# Patient Record
Sex: FEMALE | Race: BLACK OR AFRICAN AMERICAN | Employment: UNEMPLOYED | URBAN - METROPOLITAN AREA
[De-identification: names, ages, dates, MRNs, and addresses within clinical notes are randomized per-mention and may not be internally consistent; named-entity substitution may affect disease eponyms.]

---

## 2018-06-16 ENCOUNTER — HOSPITAL ENCOUNTER (EMERGENCY)
Age: 48
Discharge: HOME | End: 2018-06-16
Attending: EMERGENCY MEDICINE
Payer: COMMERCIAL

## 2018-06-16 ENCOUNTER — APPOINTMENT (OUTPATIENT)
Dept: GENERAL RADIOLOGY | Age: 48
End: 2018-06-16
Attending: EMERGENCY MEDICINE
Payer: COMMERCIAL

## 2018-06-16 VITALS
BODY MASS INDEX: 37.8 KG/M2 | RESPIRATION RATE: 18 BRPM | HEIGHT: 71 IN | OXYGEN SATURATION: 98 % | TEMPERATURE: 98.3 F | HEART RATE: 80 BPM | DIASTOLIC BLOOD PRESSURE: 70 MMHG | SYSTOLIC BLOOD PRESSURE: 120 MMHG | WEIGHT: 270 LBS

## 2018-06-16 DIAGNOSIS — R07.9 ACUTE CHEST PAIN: Primary | ICD-10-CM

## 2018-06-16 LAB
ALBUMIN SERPL-MCNC: 3.3 G/DL (ref 3.5–5)
ALBUMIN/GLOB SERPL: 0.8 {RATIO} (ref 1.2–3.5)
ALP SERPL-CCNC: 66 U/L (ref 50–136)
ALT SERPL-CCNC: 17 U/L (ref 12–65)
ANION GAP SERPL CALC-SCNC: 9 MMOL/L (ref 7–16)
AST SERPL-CCNC: 26 U/L (ref 15–37)
BASOPHILS # BLD: 0 K/UL (ref 0–0.2)
BASOPHILS NFR BLD: 0 % (ref 0–2)
BILIRUB SERPL-MCNC: 0.3 MG/DL (ref 0.2–1.1)
BNP SERPL-MCNC: 9 PG/ML
BUN SERPL-MCNC: 13 MG/DL (ref 6–23)
CALCIUM SERPL-MCNC: 9.5 MG/DL (ref 8.3–10.4)
CHLORIDE SERPL-SCNC: 106 MMOL/L (ref 98–107)
CO2 SERPL-SCNC: 24 MMOL/L (ref 21–32)
CREAT SERPL-MCNC: 1.05 MG/DL (ref 0.6–1)
DIFFERENTIAL METHOD BLD: ABNORMAL
EOSINOPHIL # BLD: 0.2 K/UL (ref 0–0.8)
EOSINOPHIL NFR BLD: 3 % (ref 0.5–7.8)
ERYTHROCYTE [DISTWIDTH] IN BLOOD BY AUTOMATED COUNT: 15.9 % (ref 11.9–14.6)
GLOBULIN SER CALC-MCNC: 4.3 G/DL (ref 2.3–3.5)
GLUCOSE SERPL-MCNC: 99 MG/DL (ref 65–100)
HCT VFR BLD AUTO: 35.4 % (ref 35.8–46.3)
HGB BLD-MCNC: 11.4 G/DL (ref 11.7–15.4)
IMM GRANULOCYTES # BLD: 0 K/UL (ref 0–0.5)
IMM GRANULOCYTES NFR BLD AUTO: 0 % (ref 0–5)
LYMPHOCYTES # BLD: 2.3 K/UL (ref 0.5–4.6)
LYMPHOCYTES NFR BLD: 34 % (ref 13–44)
MCH RBC QN AUTO: 24.9 PG (ref 26.1–32.9)
MCHC RBC AUTO-ENTMCNC: 32.2 G/DL (ref 31.4–35)
MCV RBC AUTO: 77.5 FL (ref 79.6–97.8)
MONOCYTES # BLD: 0.6 K/UL (ref 0.1–1.3)
MONOCYTES NFR BLD: 9 % (ref 4–12)
NEUTS SEG # BLD: 3.6 K/UL (ref 1.7–8.2)
NEUTS SEG NFR BLD: 54 % (ref 43–78)
PLATELET # BLD AUTO: 307 K/UL (ref 150–450)
PMV BLD AUTO: 9.3 FL (ref 10.8–14.1)
POTASSIUM SERPL-SCNC: 4.2 MMOL/L (ref 3.5–5.1)
PROT SERPL-MCNC: 7.6 G/DL (ref 6.3–8.2)
RBC # BLD AUTO: 4.57 M/UL (ref 4.05–5.25)
SODIUM SERPL-SCNC: 139 MMOL/L (ref 136–145)
TROPONIN I BLD-MCNC: 0 NG/ML (ref 0.02–0.05)
TROPONIN I BLD-MCNC: 0 NG/ML (ref 0.02–0.05)
WBC # BLD AUTO: 6.7 K/UL (ref 4.3–11.1)

## 2018-06-16 PROCEDURE — 85025 COMPLETE CBC W/AUTO DIFF WBC: CPT | Performed by: EMERGENCY MEDICINE

## 2018-06-16 PROCEDURE — 71045 X-RAY EXAM CHEST 1 VIEW: CPT

## 2018-06-16 PROCEDURE — 99285 EMERGENCY DEPT VISIT HI MDM: CPT | Performed by: EMERGENCY MEDICINE

## 2018-06-16 PROCEDURE — 93005 ELECTROCARDIOGRAM TRACING: CPT | Performed by: EMERGENCY MEDICINE

## 2018-06-16 PROCEDURE — 83880 ASSAY OF NATRIURETIC PEPTIDE: CPT | Performed by: EMERGENCY MEDICINE

## 2018-06-16 PROCEDURE — 84484 ASSAY OF TROPONIN QUANT: CPT

## 2018-06-16 PROCEDURE — 80053 COMPREHEN METABOLIC PANEL: CPT | Performed by: EMERGENCY MEDICINE

## 2018-06-16 PROCEDURE — 74011250637 HC RX REV CODE- 250/637: Performed by: EMERGENCY MEDICINE

## 2018-06-16 PROCEDURE — 74011000250 HC RX REV CODE- 250: Performed by: EMERGENCY MEDICINE

## 2018-06-16 PROCEDURE — 74011250636 HC RX REV CODE- 250/636: Performed by: EMERGENCY MEDICINE

## 2018-06-16 RX ORDER — DEXAMETHASONE SODIUM PHOSPHATE 100 MG/10ML
10 INJECTION INTRAMUSCULAR; INTRAVENOUS
Status: COMPLETED | OUTPATIENT
Start: 2018-06-16 | End: 2018-06-16

## 2018-06-16 RX ORDER — NAPROXEN SODIUM 550 MG/1
550 TABLET ORAL 2 TIMES DAILY WITH MEALS
Qty: 20 TAB | Refills: 0 | Status: SHIPPED | OUTPATIENT
Start: 2018-06-16 | End: 2019-05-02

## 2018-06-16 RX ORDER — RANITIDINE 150 MG/1
150 TABLET, FILM COATED ORAL 2 TIMES DAILY
Qty: 60 TAB | Refills: 2 | Status: SHIPPED | OUTPATIENT
Start: 2018-06-16 | End: 2019-05-10

## 2018-06-16 RX ORDER — LIDOCAINE HYDROCHLORIDE 20 MG/ML
15 SOLUTION OROPHARYNGEAL
Status: COMPLETED | OUTPATIENT
Start: 2018-06-16 | End: 2018-06-16

## 2018-06-16 RX ADMIN — DEXAMETHASONE SODIUM PHOSPHATE 10 MG: 10 INJECTION INTRAMUSCULAR; INTRAVENOUS at 23:10

## 2018-06-16 RX ADMIN — Medication 30 ML: at 20:32

## 2018-06-16 RX ADMIN — LIDOCAINE HYDROCHLORIDE 15 ML: 20 SOLUTION ORAL; TOPICAL at 20:32

## 2018-06-16 NOTE — LETTER
[address] [facility] EMERGENCY DEPT One [address] [address] 
[phone] Work/School Note Date: 6/16/2018 To Whom It May concern: 
 
Rebeca Mims was seen and treated today in the emergency room by the following provider(s): 
Attending Provider: Gianna Ellis MD. Rebeca Mims may return to work on 06/19/18 Sincerely, Gonzalo Evangelista

## 2018-06-17 LAB
ATRIAL RATE: 100 BPM
CALCULATED P AXIS, ECG09: 59 DEGREES
CALCULATED R AXIS, ECG10: 30 DEGREES
CALCULATED T AXIS, ECG11: 39 DEGREES
DIAGNOSIS, 93000: NORMAL
P-R INTERVAL, ECG05: 172 MS
Q-T INTERVAL, ECG07: 312 MS
QRS DURATION, ECG06: 70 MS
QTC CALCULATION (BEZET), ECG08: 402 MS
VENTRICULAR RATE, ECG03: 100 BPM

## 2018-06-17 NOTE — ED NOTES
I have reviewed discharge instructions with the patient. The patient verbalized understanding. Patient left ED via Discharge Method: ambulatory to Home with daughter. Opportunity for questions and clarification provided. Patient given 2 scripts. To continue your aftercare when you leave the hospital, you may receive an automated call from our care team to check in on how you are doing. This is a free service and part of our promise to provide the best care and service to meet your aftercare needs.  If you have questions, or wish to unsubscribe from this service please call [phone]. Thank you for Choosing our [facility] Emergency Department.

## 2018-06-17 NOTE — ED PROVIDER NOTES
HPI Comments: Per nurse's notes: \"Pt arrived via POV with c/o chest pain. \"It started at work earlier today, like a tightness in my chest. Came back about a half hour ago and now it's tight, kind of goes up into my throat; it;'s bad. \" \"    Patient states she chronically has trouble with heartburn, rarely takes anything for it but if she does take something at this point for mustard. Patient is a 41 y.o. female presenting with chest pain. The history is provided by the patient and a relative. Chest Pain (Angina)    This is a new problem. The current episode started 3 to 5 hours ago. The problem has been gradually improving. Duration of episode(s) is 3 hours. The problem occurs rarely. The pain is associated with normal activity and stress. The pain is present in the substernal region. The pain is moderate. The quality of the pain is described as sharp and dull. The pain radiates to the left neck and right neck. Exacerbated by: nothing. Associated symptoms include diaphoresis and nausea. Pertinent negatives include no abdominal pain, no back pain, no claudication, no cough, no dizziness, no exertional chest pressure, no fever, no headaches, no hemoptysis, no irregular heartbeat, no leg pain, no lower extremity edema, no malaise/fatigue, no near-syncope, no numbness, no orthopnea, no palpitations, no PND, no shortness of breath, no sputum production, no vomiting and no weakness. She has tried rest for the symptoms. The treatment provided mild relief. Risk factors include no risk factors. Her past medical history does not include aneurysm, cancer, DM, DVT, HTN, PE or CHF. Pertinent negatives include no exercise treadmill test.       Past Medical History:   Diagnosis Date    Ill-defined condition     hx migraines       Past Surgical History:   Procedure Laterality Date    HX TUBAL LIGATION           No family history on file.     Social History     Social History    Marital status: SINGLE     Spouse name: N/A   Adan Humphrey Number of children: N/A    Years of education: N/A     Occupational History    Not on file. Social History Main Topics    Smoking status: Never Smoker    Smokeless tobacco: Not on file    Alcohol use No    Drug use: No    Sexual activity: No     Other Topics Concern    Not on file     Social History Narrative         ALLERGIES: Review of patient's allergies indicates no known allergies. Review of Systems   Constitutional: Positive for diaphoresis. Negative for chills, fever and malaise/fatigue. Respiratory: Negative for cough, hemoptysis, sputum production and shortness of breath. Cardiovascular: Positive for chest pain. Negative for palpitations, orthopnea, claudication, PND and near-syncope. Gastrointestinal: Positive for nausea. Negative for abdominal pain and vomiting. Musculoskeletal: Negative for back pain. Neurological: Negative for dizziness, weakness, numbness and headaches. All other systems reviewed and are negative. Vitals:    06/16/18 2007   BP: (!) 168/96   Pulse: (!) 104   Resp: 20   Temp: 98.3 °F (36.8 °C)   SpO2: 99%   Weight: 122.5 kg (270 lb)   Height: 5' 11\" (1.803 m)            Physical Exam   Constitutional: She is oriented to person, place, and time. She appears well-developed and well-nourished. No distress. HENT:   Head: Normocephalic and atraumatic. Right Ear: Tympanic membrane and external ear normal.   Left Ear: Tympanic membrane and external ear normal.   Mouth/Throat: Oropharynx is clear and moist.   Eyes: Conjunctivae and EOM are normal. Pupils are equal, round, and reactive to light. Neck: Normal range of motion. Neck supple. No tracheal deviation present. Cardiovascular: Normal rate, regular rhythm, normal heart sounds and intact distal pulses. Exam reveals no gallop and no friction rub. No murmur heard. Pulmonary/Chest: Effort normal and breath sounds normal. No respiratory distress. She has no wheezes. She exhibits tenderness. Abdominal: Soft. Bowel sounds are normal. She exhibits no shifting dullness, no distension, no pulsatile liver, no fluid wave, no abdominal bruit, no pulsatile midline mass and no mass. There is no hepatosplenomegaly. There is tenderness (mild ) in the epigastric area. There is no rigidity, no rebound, no guarding, no CVA tenderness, no tenderness at McBurney's point and negative Jones's sign. No hernia. Musculoskeletal: Normal range of motion. She exhibits no edema. Lymphadenopathy:     She has no cervical adenopathy. Neurological: She is alert and oriented to person, place, and time. She displays normal reflexes. No cranial nerve deficit. Skin: Skin is warm and dry. No rash noted. She is not diaphoretic. No erythema. Psychiatric: She has a normal mood and affect. Nursing note and vitals reviewed.        MDM  Number of Diagnoses or Management Options  Acute chest pain: new and requires workup     Amount and/or Complexity of Data Reviewed  Clinical lab tests: ordered and reviewed  Tests in the radiology section of CPT®: ordered and reviewed  Review and summarize past medical records: yes  Independent visualization of images, tracings, or specimens: yes    Risk of Complications, Morbidity, and/or Mortality  Presenting problems: high  Diagnostic procedures: moderate  Management options: moderate    Patient Progress  Patient progress: improved        ED Course       Procedures    Results Reviewed:      Recent Results (from the past 24 hour(s))   CBC WITH AUTOMATED DIFF    Collection Time: 06/16/18  8:21 PM   Result Value Ref Range    WBC 6.7 4.3 - 11.1 K/uL    RBC 4.57 4.05 - 5.25 M/uL    HGB 11.4 (L) 11.7 - 15.4 g/dL    HCT 35.4 (L) 35.8 - 46.3 %    MCV 77.5 (L) 79.6 - 97.8 FL    MCH 24.9 (L) 26.1 - 32.9 PG    MCHC 32.2 31.4 - 35.0 g/dL    RDW 15.9 (H) 11.9 - 14.6 %    PLATELET 280 141 - 710 K/uL    MPV 9.3 (L) 10.8 - 14.1 FL    DF AUTOMATED      NEUTROPHILS 54 43 - 78 %    LYMPHOCYTES 34 13 - 44 % MONOCYTES 9 4.0 - 12.0 %    EOSINOPHILS 3 0.5 - 7.8 %    BASOPHILS 0 0.0 - 2.0 %    IMMATURE GRANULOCYTES 0 0.0 - 5.0 %    ABS. NEUTROPHILS 3.6 1.7 - 8.2 K/UL    ABS. LYMPHOCYTES 2.3 0.5 - 4.6 K/UL    ABS. MONOCYTES 0.6 0.1 - 1.3 K/UL    ABS. EOSINOPHILS 0.2 0.0 - 0.8 K/UL    ABS. BASOPHILS 0.0 0.0 - 0.2 K/UL    ABS. IMM. GRANS. 0.0 0.0 - 0.5 K/UL   METABOLIC PANEL, COMPREHENSIVE    Collection Time: 06/16/18  8:21 PM   Result Value Ref Range    Sodium 139 136 - 145 mmol/L    Potassium 4.2 3.5 - 5.1 mmol/L    Chloride 106 98 - 107 mmol/L    CO2 24 21 - 32 mmol/L    Anion gap 9 7 - 16 mmol/L    Glucose 99 65 - 100 mg/dL    BUN 13 6 - 23 MG/DL    Creatinine 1.05 (H) 0.6 - 1.0 MG/DL    GFR est AA >60 >60 ml/min/1.73m2    GFR est non-AA >60 >60 ml/min/1.73m2    Calcium 9.5 8.3 - 10.4 MG/DL    Bilirubin, total 0.3 0.2 - 1.1 MG/DL    ALT (SGPT) 17 12 - 65 U/L    AST (SGOT) 26 15 - 37 U/L    Alk. phosphatase 66 50 - 136 U/L    Protein, total 7.6 6.3 - 8.2 g/dL    Albumin 3.3 (L) 3.5 - 5.0 g/dL    Globulin 4.3 (H) 2.3 - 3.5 g/dL    A-G Ratio 0.8 (L) 1.2 - 3.5     BNP    Collection Time: 06/16/18  8:21 PM   Result Value Ref Range    BNP 9 pg/mL   POC TROPONIN-I    Collection Time: 06/16/18  8:25 PM   Result Value Ref Range    Troponin-I (POC) 0 (L) 0.02 - 0.05 ng/ml   POC TROPONIN-I    Collection Time: 06/16/18 10:17 PM   Result Value Ref Range    Troponin-I (POC) 0 (L) 0.02 - 0.05 ng/ml     XR CHEST PORT   Final Result   IMPRESSION: No acute abnormality          I discussed the results of all labs, procedures, radiographs, and treatments with the patient and available family. Treatment plan is agreed upon and the patient is ready for discharge. All voiced understanding of the discharge plan and medication instructions or changes as appropriate. Questions about treatment in the ED were answered. All were encouraged to return should symptoms worsen or new problems develop.

## 2018-06-17 NOTE — ED TRIAGE NOTES
Pt arrived via POV with c/o chest pain. \"It started at work earlier today, like a tightness in my chest. Came back about a half hour ago and now it's tight, kind of goes up into my throat; it;'s bad. \"

## 2018-06-17 NOTE — DISCHARGE INSTRUCTIONS
Chest Pain: Care Instructions  Your Care Instructions    There are many things that can cause chest pain. Some are not serious and will get better on their own in a few days. But some kinds of chest pain need more testing and treatment. Your doctor may have recommended a follow-up visit in the next 8 to 12 hours. If you are not getting better, you may need more tests or treatment. Even though your doctor has released you, you still need to watch for any problems. The doctor carefully checked you, but sometimes problems can develop later. If you have new symptoms or if your symptoms do not get better, get medical care right away. If you have worse or different chest pain or pressure that lasts more than 5 minutes or you passed out (lost consciousness), call 911 or seek other emergency help right away. A medical visit is only one step in your treatment. Even if you feel better, you still need to do what your doctor recommends, such as going to all suggested follow-up appointments and taking medicines exactly as directed. This will help you recover and help prevent future problems. How can you care for yourself at home? · Rest until you feel better. · Take your medicine exactly as prescribed. Call your doctor if you think you are having a problem with your medicine. · Do not drive after taking a prescription pain medicine. When should you call for help? Call 911 if:  ? · You passed out (lost consciousness). ? · You have severe difficulty breathing. ? · You have symptoms of a heart attack. These may include:  ¨ Chest pain or pressure, or a strange feeling in your chest.  ¨ Sweating. ¨ Shortness of breath. ¨ Nausea or vomiting. ¨ Pain, pressure, or a strange feeling in your back, neck, jaw, or upper belly or in one or both shoulders or arms. ¨ Lightheadedness or sudden weakness. ¨ A fast or irregular heartbeat.   After you call 911, the  may tell you to chew 1 adult-strength or 2 to 4 low-dose aspirin. Wait for an ambulance. Do not try to drive yourself. ?Call your doctor today if:  ? · You have any trouble breathing. ? · Your chest pain gets worse. ? · You are dizzy or lightheaded, or you feel like you may faint. ? · You are not getting better as expected. ? · You are having new or different chest pain. Where can you learn more? Go to http://vera-rito.info/. Enter A120 in the search box to learn more about \"Chest Pain: Care Instructions. \"  Current as of: March 20, 2017  Content Version: 11.4  © 4153-5740 [employer]. Care instructions adapted under license by [employer]) (which disclaims liability or warranty for this information). If you have questions about a medical condition or this instruction, always ask your healthcare professional. [place] [address] any warranty or liability for your use of this information. Musculoskeletal Chest Pain: Care Instructions  Your Care Instructions    Chest pain is not always a sign that something is wrong with your heart or that you have another serious problem. The doctor thinks your chest pain is caused by strained muscles or ligaments, inflamed chest cartilage, or another problem in your chest, rather than by your heart. You may need more tests to find the cause of your chest pain. Follow-up care is a key part of your treatment and safety. Be sure to make and go to all appointments, and call your doctor if you are having problems. It's also a good idea to know your test results and keep a list of the medicines you take. How can you care for yourself at home? · Take pain medicines exactly as directed. ¨ If the doctor gave you a prescription medicine for pain, take it as prescribed. ¨ If you are not taking a prescription pain medicine, ask your doctor if you can take an over-the-counter medicine. · Rest and protect the sore area.   · Stop, change, or take a break from any activity that may be causing your pain or soreness. · Put ice or a cold pack on the sore area for 10 to 20 minutes at a time. Try to do this every 1 to 2 hours for the next 3 days (when you are awake) or until the swelling goes down. Put a thin cloth between the ice and your skin. · After 2 or 3 days, apply a heating pad set on low or a warm cloth to the area that hurts. Some doctors suggest that you go back and forth between hot and cold. · Do not wrap or tape your ribs for support. This may cause you to take smaller breaths, which could increase your risk of lung problems. · Mentholated creams such as Bengay or Icy Hot may soothe sore muscles. Follow the instructions on the package. · Follow your doctor's instructions for exercising. · Gentle stretching and massage may help you get better faster. Stretch slowly to the point just before pain begins, and hold the stretch for at least 15 to 30 seconds. Do this 3 or 4 times a day. Stretch just after you have applied heat. · As your pain gets better, slowly return to your normal activities. Any increased pain may be a sign that you need to rest a while longer. When should you call for help? Call 911 anytime you think you may need emergency care. For example, call if:  ? · You have chest pain or pressure. This may occur with:  ¨ Sweating. ¨ Shortness of breath. ¨ Nausea or vomiting. ¨ Pain that spreads from the chest to the neck, jaw, or one or both shoulders or arms. ¨ Dizziness or lightheadedness. ¨ A fast or uneven pulse. After calling 911, chew 1 adult-strength aspirin. Wait for an ambulance. Do not try to drive yourself. ? · You have sudden chest pain and shortness of breath, or you cough up blood. ?Call your doctor now or seek immediate medical care if:  ? · You have any trouble breathing. ? · Your chest pain gets worse. ? · Your chest pain occurs consistently with exercise and is relieved by rest.   ? Watch closely for changes in your health, and be sure to contact your doctor if:  ? · Your chest pain does not get better after 1 week. Where can you learn more? Go to http://vera-rito.info/. Enter V293 in the search box to learn more about \"Musculoskeletal Chest Pain: Care Instructions. \"  Current as of: March 20, 2017  Content Version: 11.4  © 9898-3078 [employer]. Care instructions adapted under license by [employer] (which disclaims liability or warranty for this information). If you have questions about a medical condition or this instruction, always ask your healthcare professional. [place] [address] any warranty or liability for your use of this information.

## 2019-05-02 ENCOUNTER — HOSPITAL ENCOUNTER (EMERGENCY)
Age: 49
Discharge: HOME | End: 2019-05-02
Attending: EMERGENCY MEDICINE
Payer: SELF-PAY

## 2019-05-02 ENCOUNTER — APPOINTMENT (OUTPATIENT)
Dept: GENERAL RADIOLOGY | Age: 49
End: 2019-05-02
Attending: EMERGENCY MEDICINE
Payer: SELF-PAY

## 2019-05-02 VITALS
SYSTOLIC BLOOD PRESSURE: 142 MMHG | DIASTOLIC BLOOD PRESSURE: 75 MMHG | RESPIRATION RATE: 20 BRPM | TEMPERATURE: 98.3 F | HEIGHT: 71 IN | BODY MASS INDEX: 37.8 KG/M2 | HEART RATE: 86 BPM | WEIGHT: 270 LBS | OXYGEN SATURATION: 94 %

## 2019-05-02 DIAGNOSIS — D50.8 OTHER IRON DEFICIENCY ANEMIA: ICD-10-CM

## 2019-05-02 DIAGNOSIS — R07.89 ATYPICAL CHEST PAIN: Primary | ICD-10-CM

## 2019-05-02 LAB
ALBUMIN SERPL-MCNC: 3.1 G/DL (ref 3.5–5)
ALBUMIN/GLOB SERPL: 0.7 {RATIO} (ref 1.2–3.5)
ALP SERPL-CCNC: 68 U/L (ref 50–136)
ALT SERPL-CCNC: 13 U/L (ref 12–65)
ANION GAP SERPL CALC-SCNC: 9 MMOL/L (ref 7–16)
AST SERPL-CCNC: 19 U/L (ref 15–37)
ATRIAL RATE: 110 BPM
BASOPHILS # BLD: 0 K/UL (ref 0–0.2)
BASOPHILS NFR BLD: 1 % (ref 0–2)
BILIRUB SERPL-MCNC: 0.4 MG/DL (ref 0.2–1.1)
BUN SERPL-MCNC: 7 MG/DL (ref 6–23)
CALCIUM SERPL-MCNC: 8.9 MG/DL (ref 8.3–10.4)
CALCULATED P AXIS, ECG09: 60 DEGREES
CALCULATED R AXIS, ECG10: 51 DEGREES
CALCULATED T AXIS, ECG11: 28 DEGREES
CHLORIDE SERPL-SCNC: 109 MMOL/L (ref 98–107)
CO2 SERPL-SCNC: 22 MMOL/L (ref 21–32)
CREAT SERPL-MCNC: 0.91 MG/DL (ref 0.6–1)
D DIMER PPP FEU-MCNC: <0.27 UG/ML(FEU)
DIAGNOSIS, 93000: NORMAL
DIFFERENTIAL METHOD BLD: ABNORMAL
EOSINOPHIL # BLD: 0.2 K/UL (ref 0–0.8)
EOSINOPHIL NFR BLD: 2 % (ref 0.5–7.8)
ERYTHROCYTE [DISTWIDTH] IN BLOOD BY AUTOMATED COUNT: 16.2 % (ref 11.9–14.6)
FERRITIN SERPL-MCNC: 9 NG/ML (ref 8–388)
FOLATE SERPL-MCNC: 6.7 NG/ML (ref 3.1–17.5)
GLOBULIN SER CALC-MCNC: 4.2 G/DL (ref 2.3–3.5)
GLUCOSE SERPL-MCNC: 99 MG/DL (ref 65–100)
HCT VFR BLD AUTO: 34.2 % (ref 35.8–46.3)
HGB BLD-MCNC: 10.2 G/DL (ref 11.7–15.4)
IMM GRANULOCYTES # BLD AUTO: 0 K/UL (ref 0–0.5)
IMM GRANULOCYTES NFR BLD AUTO: 0 % (ref 0–5)
IRON SATN MFR SERPL: 10 %
IRON SERPL-MCNC: 34 UG/DL (ref 35–150)
LYMPHOCYTES # BLD: 2 K/UL (ref 0.5–4.6)
LYMPHOCYTES NFR BLD: 29 % (ref 13–44)
MCH RBC QN AUTO: 22.2 PG (ref 26.1–32.9)
MCHC RBC AUTO-ENTMCNC: 29.8 G/DL (ref 31.4–35)
MCV RBC AUTO: 74.3 FL (ref 79.6–97.8)
MONOCYTES # BLD: 0.5 K/UL (ref 0.1–1.3)
MONOCYTES NFR BLD: 7 % (ref 4–12)
NEUTS SEG # BLD: 4.1 K/UL (ref 1.7–8.2)
NEUTS SEG NFR BLD: 61 % (ref 43–78)
NRBC # BLD: 0 K/UL (ref 0–0.2)
P-R INTERVAL, ECG05: 162 MS
PLATELET # BLD AUTO: 317 K/UL (ref 150–450)
PMV BLD AUTO: 9 FL (ref 9.4–12.3)
POTASSIUM SERPL-SCNC: 4.2 MMOL/L (ref 3.5–5.1)
PROT SERPL-MCNC: 7.3 G/DL (ref 6.3–8.2)
Q-T INTERVAL, ECG07: 328 MS
QRS DURATION, ECG06: 78 MS
QTC CALCULATION (BEZET), ECG08: 443 MS
RBC # BLD AUTO: 4.6 M/UL (ref 4.05–5.2)
SODIUM SERPL-SCNC: 140 MMOL/L (ref 136–145)
TIBC SERPL-MCNC: 345 UG/DL (ref 250–450)
TROPONIN I BLD-MCNC: 0 NG/ML (ref 0.02–0.05)
TROPONIN I BLD-MCNC: 0 NG/ML (ref 0.02–0.05)
VENTRICULAR RATE, ECG03: 110 BPM
VIT B12 SERPL-MCNC: 352 PG/ML (ref 193–986)
WBC # BLD AUTO: 6.8 K/UL (ref 4.3–11.1)

## 2019-05-02 PROCEDURE — 71045 X-RAY EXAM CHEST 1 VIEW: CPT

## 2019-05-02 PROCEDURE — 82746 ASSAY OF FOLIC ACID SERUM: CPT

## 2019-05-02 PROCEDURE — 82728 ASSAY OF FERRITIN: CPT

## 2019-05-02 PROCEDURE — 84484 ASSAY OF TROPONIN QUANT: CPT

## 2019-05-02 PROCEDURE — 83540 ASSAY OF IRON: CPT

## 2019-05-02 PROCEDURE — 85025 COMPLETE CBC W/AUTO DIFF WBC: CPT

## 2019-05-02 PROCEDURE — 74011250637 HC RX REV CODE- 250/637: Performed by: EMERGENCY MEDICINE

## 2019-05-02 PROCEDURE — 85379 FIBRIN DEGRADATION QUANT: CPT

## 2019-05-02 PROCEDURE — 80053 COMPREHEN METABOLIC PANEL: CPT

## 2019-05-02 PROCEDURE — 93005 ELECTROCARDIOGRAM TRACING: CPT | Performed by: EMERGENCY MEDICINE

## 2019-05-02 PROCEDURE — 82607 VITAMIN B-12: CPT

## 2019-05-02 PROCEDURE — 99285 EMERGENCY DEPT VISIT HI MDM: CPT | Performed by: EMERGENCY MEDICINE

## 2019-05-02 RX ORDER — GUAIFENESIN 100 MG/5ML
324 LIQUID (ML) ORAL
Status: COMPLETED | OUTPATIENT
Start: 2019-05-02 | End: 2019-05-02

## 2019-05-02 RX ORDER — LANOLIN ALCOHOL/MO/W.PET/CERES
325 CREAM (GRAM) TOPICAL
Qty: 30 TAB | Refills: 0 | Status: SHIPPED | OUTPATIENT
Start: 2019-05-02

## 2019-05-02 RX ADMIN — ASPIRIN 81 MG 324 MG: 81 TABLET ORAL at 11:08

## 2019-05-02 NOTE — DISCHARGE INSTRUCTIONS
Patient Education     Patient Education   Take a low-dose 81 mg aspirin once daily until told otherwise by [facility]. Iron rich diet. Iron as directed. Over-the-counter MiraLAX and increased fluid intake for constipation should it occur. Return if any new, worsening or concerning symptoms. Follow-up with [facility] when called with appointment time. Call them Monday if you have not heard from them. Call your primary care doctor's office tomorrow for follow-up in the next couple of weeks for recheck and continued monitoring of your iron deficiency anemia and chest pain as well. Iron-Rich Diet: Care Instructions  Your Care Instructions    Your body needs iron to make hemoglobin. Hemoglobin is a substance in red blood cells that carries oxygen from the lungs to cells all through your body. If you do not get enough iron, your body makes fewer and smaller red blood cells. As a result, your body's cells may not get enough oxygen. Adult men need 8 milligrams of iron a day; adult women need 18 milligrams of iron a day. After menopause, women need 8 milligrams of iron a day. A pregnant woman needs 27 milligrams of iron a day. Infants and young children have higher iron needs relative to their size than other age groups. People who have lost blood because of ulcers or heavy menstrual periods may become very low in iron and may develop anemia. Most people can get the iron their bodies need by eating enough of certain iron-rich foods. Your doctor may recommend that you take an iron supplement along with eating an iron-rich diet. Follow-up care is a key part of your treatment and safety. Be sure to make and go to all appointments, and call your doctor if you are having problems. It's also a good idea to know your test results and keep a list of the medicines you take. How can you care for yourself at home? · Make iron-rich foods a part of your daily diet. Iron-rich foods include:  ?  All meats, such as chicken, beef, lamb, pork, fish, and shellfish. Liver is especially high in iron. ? Leafy green vegetables. ? Raisins, peas, beans, lentils, barley, and eggs. ? Iron-fortified breakfast cereals. · Eat foods with vitamin C along with iron-rich foods. Vitamin C helps you absorb more iron from food. Drink a glass of orange juice or another citrus juice with your food. · Eat meat and vegetables or grains together. The iron in meat helps your body absorb the iron in other foods. Where can you learn more? Go to http://veraSmartHabitatrito.info/. Enter 0328 [id number] in the search box to learn more about \"Iron-Rich Diet: Care Instructions. \"  Current as of: March 28, 2018  Content Version: 11.9  © 8132-2742 [employer]. Care instructions adapted under license by [employer] (which disclaims liability or warranty for this information). If you have questions about a medical condition or this instruction, always ask your healthcare professional. [place] [address] any warranty or liability for your use of this information. Chest Pain: Care Instructions  Your Care Instructions    There are many things that can cause chest pain. Some are not serious and will get better on their own in a few days. But some kinds of chest pain need more testing and treatment. Your doctor may have recommended a follow-up visit in the next 8 to 12 hours. If you are not getting better, you may need more tests or treatment. Even though your doctor has released you, you still need to watch for any problems. The doctor carefully checked you, but sometimes problems can develop later. If you have new symptoms or if your symptoms do not get better, get medical care right away. If you have worse or different chest pain or pressure that lasts more than 5 minutes or you passed out (lost consciousness), call 911 or seek other emergency help right away.    A medical visit is only one step in your treatment. Even if you feel better, you still need to do what your doctor recommends, such as going to all suggested follow-up appointments and taking medicines exactly as directed. This will help you recover and help prevent future problems. How can you care for yourself at home? · Rest until you feel better. · Take your medicine exactly as prescribed. Call your doctor if you think you are having a problem with your medicine. · Do not drive after taking a prescription pain medicine. When should you call for help? Call 911 if:    · You passed out (lost consciousness).     · You have severe difficulty breathing.     · You have symptoms of a heart attack. These may include:  ? Chest pain or pressure, or a strange feeling in your chest.  ? Sweating. ? Shortness of breath. ? Nausea or vomiting. ? Pain, pressure, or a strange feeling in your back, neck, jaw, or upper belly or in one or both shoulders or arms. ? Lightheadedness or sudden weakness. ? A fast or irregular heartbeat. After you call 911, the  may tell you to chew 1 adult-strength or 2 to 4 low-dose aspirin. Wait for an ambulance. Do not try to drive yourself.    Call your doctor today if:    · You have any trouble breathing.     · Your chest pain gets worse.     · You are dizzy or lightheaded, or you feel like you may faint.     · You are not getting better as expected.     · You are having new or different chest pain. Where can you learn more? Go to http://vera-rito.info/. Enter A120 in the search box to learn more about \"Chest Pain: Care Instructions. \"  Current as of: September 23, 2018  Content Version: 11.9  © 8845-2648 [employer]. Care instructions adapted under license by [employer] (which disclaims liability or warranty for this information).  If you have questions about a medical condition or this instruction, always ask your healthcare professional. Teresa Carlos disclaims any warranty or liability for your use of this information. Patient Education        Iron Deficiency Anemia: Care Instructions  Your Care Instructions    Anemia means that you do not have enough red blood cells. Red blood cells carry oxygen around your body. When you have anemia, it can make you pale, weak, and tired. Many things can cause anemia. The most common cause is loss of blood. This can happen if you have heavy menstrual periods. It can also happen if you have bleeding in your stomach or bowel. You can also get anemia if you don't have enough iron in your diet or if it's hard for your body to absorb iron. In some cases, pregnancy causes anemia. That's because a pregnant woman needs more iron. Your doctor may do more tests to find the cause of your anemia. If a disease or other health problem is causing it, your doctor will treat that problem. It's important to follow up with your doctor to make sure that your iron level returns to normal.  Follow-up care is a key part of your treatment and safety. Be sure to make and go to all appointments, and call your doctor if you are having problems. It's also a good idea to know your test results and keep a list of the medicines you take. How can you care for yourself at home? · If your doctor recommended iron pills, take them as directed. ? Try to take the pills on an empty stomach. You can do this about 1 hour before or 2 hours after meals. But you may need to take iron with food to avoid an upset stomach. ? Do not take antacids or drink milk or anything with caffeine within 2 hours of when you take your iron. They can keep your body from absorbing the iron well. ? Vitamin C helps your body absorb iron. You may want to take iron pills with a glass of orange juice or some other food high in vitamin C.  ? Iron pills may cause stomach problems. These include heartburn, nausea, diarrhea, constipation, and cramps.  It can help to drink plenty of fluids and include fruits, vegetables, and fiber in your diet. ? It's normal for iron pills to make your stool a greenish or grayish black. But internal bleeding can also cause dark stool. So it's important to tell your doctor about any color changes. ? Call your doctor if you think you are having a problem with your iron pills. Even after you start to feel better, it will take several months for your body to build up its supply of iron. ? If you miss a pill, don't take a double dose. ? Keep iron pills out of the reach of small children. Too much iron can be very dangerous. · Eat foods with a lot of iron. These include red meat, shellfish, poultry, and eggs. They also include beans, raisins, whole-grain bread, and leafy green vegetables. · Steam your vegetables. This is the best way to prepare them if you want to get as much iron as possible. · Be safe with medicines. Do not take nonsteroidal anti-inflammatory pain relievers unless your doctor tells you to. These include aspirin, naproxen (Aleve), and ibuprofen (Advil, Motrin). · Liquid iron can stain your teeth. But you can mix it with water or juice and drink it with a straw. Then it won't get on your teeth. When should you call for help? Call 911 anytime you think you may need emergency care. For example, call if:    · You passed out (lost consciousness).    Call your doctor now or seek immediate medical care if:    · You are short of breath.     · You are dizzy or light-headed, or you feel like you may faint.     · You have new or worse bleeding.    Watch closely for changes in your health, and be sure to contact your doctor if:    · You feel weaker or more tired than usual.     · You do not get better as expected. Where can you learn more? Go to http://vera-rito.info/. Enter A898 in the search box to learn more about \"Iron Deficiency Anemia: Care Instructions. \"  Current as of:  May 6, 2018  Content Version: 11.9  © 1451-2546 [employer]. Care instructions adapted under license by [employer] (which disclaims liability or warranty for this information). If you have questions about a medical condition or this instruction, always ask your healthcare professional. [address] any warranty or liability for your use of this information.

## 2019-05-02 NOTE — ED PROVIDER NOTES
The history is provided by the patient. Chest Pain (Angina) This is a new problem. The current episode started more than 2 days ago. The problem has not changed since onset. Episode frequency: intermittent. The pain is associated with breathing and movement. The pain is present in the right side. The pain is moderate. The quality of the pain is described as pressure-like. The pain does not radiate. The symptoms are aggravated by movement, palpation, certain positions and deep breathing. Associated symptoms include cough (For several weeks), lower extremity edema (Bilateral ankles and feet), nausea and shortness of breath. Pertinent negatives include no back pain, no diaphoresis, no fever, no leg pain, no numbness, no palpitations, no vomiting and no weakness. She has tried nothing for the symptoms. Risk factors include no risk factors. Her past medical history does not include DVT or PE. Pertinent negatives include no cardiac catheterization, no persantine thallium, no stress thallium, no exercise treadmill test, no cardiac stents, no angioplasty and no CABG. Past Medical History:  
Diagnosis Date  Ill-defined condition   
 hx migraines Past Surgical History:  
Procedure Laterality Date  HX TUBAL LIGATION History reviewed. No pertinent family history. Social History Socioeconomic History  Marital status: SINGLE Spouse name: Not on file  Number of children: Not on file  Years of education: Not on file  Highest education level: Not on file Occupational History  Not on file Social Needs  Financial resource strain: Not on file  Food insecurity:  
  Worry: Not on file Inability: Not on file  Transportation needs:  
  Medical: Not on file Non-medical: Not on file Tobacco Use  Smoking status: Never Smoker Substance and Sexual Activity  Alcohol use: No  
 Drug use: No  
 Sexual activity: Never   Birth control/protection: Surgical  
 Lifestyle  Physical activity:  
  Days per week: Not on file Minutes per session: Not on file  Stress: Not on file Relationships  Social connections:  
  Talks on phone: Not on file Gets together: Not on file Attends Hinduism service: Not on file Active member of club or organization: Not on file Attends meetings of clubs or organizations: Not on file Relationship status: Not on file  Intimate partner violence:  
  Fear of current or ex partner: Not on file Emotionally abused: Not on file Physically abused: Not on file Forced sexual activity: Not on file Other Topics Concern  Not on file Social History Narrative  Not on file ALLERGIES: Patient has no known allergies. Review of Systems Constitutional: Negative for diaphoresis and fever. HENT: Positive for congestion and rhinorrhea. Respiratory: Positive for cough (For several weeks) and shortness of breath. Cardiovascular: Positive for chest pain. Negative for palpitations. Gastrointestinal: Positive for nausea. Negative for diarrhea and vomiting. Endocrine: Positive for polyuria. Genitourinary: Negative for dysuria. Musculoskeletal: Negative for back pain and neck pain. Neurological: Negative for weakness and numbness. Vitals:  
 05/02/19 1006 BP: 171/85 Pulse: (!) 113 Resp: 18 Temp: 98.3 °F (36.8 °C) SpO2: 98% Weight: 122.5 kg (270 lb) Height: 5' 11\" (1.803 m) Physical Exam  
Constitutional: She appears well-developed and well-nourished. No distress. HENT:  
Head: Normocephalic. Eyes: Pupils are equal, round, and reactive to light. Cardiovascular: Normal rate, regular rhythm and normal heart sounds. Pulses: 
     Carotid pulses are 2+ on the right side, and 2+ on the left side. Radial pulses are 2+ on the right side, and 2+ on the left side.   
Pulmonary/Chest: Effort normal and breath sounds normal. She exhibits tenderness (Right-sided costochondral and right-sided chest walltenderness, reproducible of her pain). Abdominal: Soft. She exhibits no distension and no mass. There is no tenderness. There is no rebound and no guarding. Musculoskeletal: Normal range of motion. She exhibits no edema or tenderness. Lymphadenopathy:  
  She has no cervical adenopathy. Neurological: She is alert. Skin: Skin is warm and dry. Nursing note and vitals reviewed. MDM Number of Diagnoses or Management Options Diagnosis management comments: Patient is tachycardic and the pain is somewhat pleuritic. She states it is better when she sits up and worse when she lies back, however I do not hear any rubs and EKG not consistent with pericarditis. No acute ST elevation or depression. Rule out MI.  D-dimer to exclude PE. Chest x-ray to exclude pneumothorax. Doubt aortic dissection given equal pulses bilaterally, however we will evaluate mediastinum on chest x-ray as well. No history of hypertension. No cardiac risk factors. 1:31 PM 
Atypical pain. Heart score low risk. D-dimer normal.  Chest x-ray unremarkable. Identified iron deficiency anemia and will address with iron rich diet and iron supplement. Patient given precautions on possible constipation. Patient does take a low-dose 81 mg aspirin daily until told otherwise by [facility]. Patient referred to [facility] for an outpatient stress test tomorrow or early next week at the latest.  Patient instructed to return over the weekend should she have any problems or concerns. Amount and/or Complexity of Data Reviewed Clinical lab tests: ordered and reviewed (Results for orders placed or performed during the hospital encounter of 05/02/19 
-CBC WITH AUTOMATED DIFF Result                      Value             Ref Range      WBC                         6.8               4.3 - 11.1 K* 
 RBC                         4.60              4.05 - 5.2 M* HGB                         10.2 (L)          11.7 - 15.4 * HCT                         34.2 (L)          35.8 - 46.3 % MCV                         74.3 (L)          79.6 - 97.8 * MCH                         22.2 (L)          26.1 - 32.9 * MCHC                        29.8 (L)          31.4 - 35.0 * RDW                         16.2 (H)          11.9 - 14.6 % PLATELET                    317               150 - 450 K/* MPV                         9.0 (L)           9.4 - 12.3 FL ABSOLUTE NRBC               0.00              0.0 - 0.2 K/* DF                          AUTOMATED NEUTROPHILS                 61                43 - 78 % LYMPHOCYTES                 29                13 - 44 % MONOCYTES                   7                 4.0 - 12.0 % EOSINOPHILS                 2                 0.5 - 7.8 % BASOPHILS                   1                 0.0 - 2.0 % IMMATURE GRANULOCYTES       0                 0.0 - 5.0 %   
     ABS. NEUTROPHILS            4.1               1.7 - 8.2 K/* ABS. LYMPHOCYTES            2.0               0.5 - 4.6 K/* ABS. MONOCYTES              0.5               0.1 - 1.3 K/* ABS. EOSINOPHILS            0.2               0.0 - 0.8 K/* ABS. BASOPHILS              0.0               0.0 - 0.2 K/* ABS. IMM. GRANS.            0.0               0.0 - 0.5 K/* 
-METABOLIC PANEL, COMPREHENSIVE Result                      Value             Ref Range Sodium                      140               136 - 145 mm* Potassium                   4.2               3.5 - 5.1 mm* Chloride                    109 (H)           98 - 107 mmo* CO2                         22                21 - 32 mmol*      Anion gap                   9                 7 - 16 mmol/L 
 Glucose                     99                65 - 100 mg/* BUN                         7                 6 - 23 MG/DL Creatinine                  0.91              0.6 - 1.0 MG* 
     GFR est AA                  >60               >60 ml/min/1* GFR est non-AA              >60               >60 ml/min/1* Calcium                     8.9               8.3 - 10.4 M* Bilirubin, total            0.4               0.2 - 1.1 MG* ALT (SGPT)                  13                12 - 65 U/L   
     AST (SGOT)                  19                15 - 37 U/L Alk. phosphatase            68                50 - 136 U/L Protein, total              7.3               6.3 - 8.2 g/* Albumin                     3.1 (L)           3.5 - 5.0 g/* Globulin                    4.2 (H)           2.3 - 3.5 g/* A-G Ratio                   0.7 (L)           1.2 - 3.5     
-D DIMER Result                      Value             Ref Range D DIMER                     <0.27             <0.56 ug/ml(* 
-TRANSFERRIN SATURATION Result                      Value             Ref Range Iron                        34 (L)            35 - 150 ug/* 
     TIBC                        345               250 - 450 ug* Transferrin Saturation      10 (L)            >20 % -FERRITIN Result                      Value             Ref Range Ferritin                    9                 8 - 388 NG/ML 
-VITAMIN B12 Result                      Value             Ref Range Vitamin B12                 352               193 - 986 pg* 
-FOLATE Result                      Value             Ref Range Folate                      6.7               3.1 - 17.5 n* 
-POC TROPONIN-I Result                      Value             Ref Range Troponin-I (POC)            0 (L)             0.02 - 0.05 * -POC TROPONIN-I Result                      Value             Ref Range Troponin-I (POC)            0 (L)             0.02 - 0.05 * 
-EKG, 12 LEAD, INITIAL Result                      Value             Ref Range Ventricular Rate            110               BPM           
     Atrial Rate                 110               BPM           
     P-R Interval                162               ms            
     QRS Duration                78                ms Q-T Interval                328               ms            
     QTC Calculation (Bezet)     443               ms            
     Calculated P Axis           60                degrees Calculated R Axis           51                degrees Calculated T Axis           28                degrees Diagnosis Sinus tachycardia Low voltage QRS Borderline ECG When compared with ECG of 16-JUN-2018 20:17, No significant change was found Confirmed by NIKITA HUGHES (), Roque Miles ([id number]) on 5/2/2019 11:27:53 AM 
  
) Tests in the radiology section of CPT®: ordered and reviewed (Xr Chest [facility] Result Date: 5/2/2019 PORTABLE CHEST, May 2, 2019 at 1049 hours CLINICAL HISTORY:  Sharp chest pain and difficulty breathing with clinical suspicion of pneumothorax. COMPARISON:  June 16, 2018. FINDINGS:  AP erect image demonstrates no confluent infiltrate or significant pleural fluid. The heart size is within normal limits without evidence of congestive heart failure or pneumothorax. The bony thorax appears intact on this view. ECMO support IMPRESSION:  NO ACUTE CARDIOPULMONARY DISEASE IDENTIFIED. ) Independent visualization of images, tracings, or specimens: yes (EKG reviewed by ED physician. Chest x-ray reviewed by ED physician.) Procedures

## 2019-05-02 NOTE — ED NOTES
I have reviewed discharge instructions with the patient. The patient verbalized understanding. Patient left ED via Discharge Method: ambulatory to Home with (insert name of family/friend, self). Opportunity for questions and clarification provided. Patient given 1 scripts. To continue your aftercare when you leave the hospital, you may receive an automated call from our care team to check in on how you are doing. This is a free service and part of our promise to provide the best care and service to meet your aftercare needs.  If you have questions, or wish to unsubscribe from this service please call [phone]. Thank you for Choosing our [facility].

## 2019-05-02 NOTE — ED TRIAGE NOTES
Reports chest pain that began when she \"rolled over on my side. \" States that this has been a problem for the past 2 days. Reports that she feels like she is \"suffocating. \"

## 2019-05-04 ENCOUNTER — APPOINTMENT (OUTPATIENT)
Dept: GENERAL RADIOLOGY | Age: 49
End: 2019-05-04
Attending: EMERGENCY MEDICINE
Payer: SELF-PAY

## 2019-05-04 ENCOUNTER — HOSPITAL ENCOUNTER (EMERGENCY)
Age: 49
Discharge: HOME | End: 2019-05-05
Payer: SELF-PAY

## 2019-05-04 DIAGNOSIS — R07.89 ATYPICAL CHEST PAIN: Primary | ICD-10-CM

## 2019-05-04 LAB
ALBUMIN SERPL-MCNC: 3.3 G/DL (ref 3.5–5)
ALBUMIN/GLOB SERPL: 0.8 {RATIO} (ref 1.2–3.5)
ALP SERPL-CCNC: 72 U/L (ref 50–136)
ALT SERPL-CCNC: 10 U/L (ref 12–65)
ANION GAP SERPL CALC-SCNC: 9 MMOL/L (ref 7–16)
AST SERPL-CCNC: 9 U/L (ref 15–37)
BASOPHILS # BLD: 0.1 K/UL (ref 0–0.2)
BASOPHILS NFR BLD: 1 % (ref 0–2)
BILIRUB SERPL-MCNC: 0.2 MG/DL (ref 0.2–1.1)
BUN SERPL-MCNC: 9 MG/DL (ref 6–23)
CALCIUM SERPL-MCNC: 8.9 MG/DL (ref 8.3–10.4)
CHLORIDE SERPL-SCNC: 108 MMOL/L (ref 98–107)
CO2 SERPL-SCNC: 22 MMOL/L (ref 21–32)
CREAT SERPL-MCNC: 1.07 MG/DL (ref 0.6–1)
DIFFERENTIAL METHOD BLD: ABNORMAL
EOSINOPHIL # BLD: 0.2 K/UL (ref 0–0.8)
EOSINOPHIL NFR BLD: 3 % (ref 0.5–7.8)
ERYTHROCYTE [DISTWIDTH] IN BLOOD BY AUTOMATED COUNT: 16.8 % (ref 11.9–14.6)
GLOBULIN SER CALC-MCNC: 4.1 G/DL (ref 2.3–3.5)
GLUCOSE SERPL-MCNC: 130 MG/DL (ref 65–100)
HCT VFR BLD AUTO: 35.7 % (ref 35.8–46.3)
HGB BLD-MCNC: 10.5 G/DL (ref 11.7–15.4)
IMM GRANULOCYTES # BLD AUTO: 0 K/UL (ref 0–0.5)
IMM GRANULOCYTES NFR BLD AUTO: 1 % (ref 0–5)
LYMPHOCYTES # BLD: 2.5 K/UL (ref 0.5–4.6)
LYMPHOCYTES NFR BLD: 35 % (ref 13–44)
MCH RBC QN AUTO: 21.9 PG (ref 26.1–32.9)
MCHC RBC AUTO-ENTMCNC: 29.4 G/DL (ref 31.4–35)
MCV RBC AUTO: 74.5 FL (ref 79.6–97.8)
MONOCYTES # BLD: 0.5 K/UL (ref 0.1–1.3)
MONOCYTES NFR BLD: 7 % (ref 4–12)
NEUTS SEG # BLD: 3.9 K/UL (ref 1.7–8.2)
NEUTS SEG NFR BLD: 54 % (ref 43–78)
NRBC # BLD: 0 K/UL (ref 0–0.2)
PLATELET # BLD AUTO: 370 K/UL (ref 150–450)
PMV BLD AUTO: 8.8 FL (ref 9.4–12.3)
POTASSIUM SERPL-SCNC: 3.7 MMOL/L (ref 3.5–5.1)
PROT SERPL-MCNC: 7.4 G/DL (ref 6.3–8.2)
RBC # BLD AUTO: 4.79 M/UL (ref 4.05–5.2)
SODIUM SERPL-SCNC: 139 MMOL/L (ref 136–145)
TROPONIN I BLD-MCNC: 0.03 NG/ML (ref 0.02–0.05)
WBC # BLD AUTO: 7.2 K/UL (ref 4.3–11.1)

## 2019-05-04 PROCEDURE — 80053 COMPREHEN METABOLIC PANEL: CPT

## 2019-05-04 PROCEDURE — 93005 ELECTROCARDIOGRAM TRACING: CPT | Performed by: EMERGENCY MEDICINE

## 2019-05-04 PROCEDURE — 84484 ASSAY OF TROPONIN QUANT: CPT

## 2019-05-04 PROCEDURE — 71045 X-RAY EXAM CHEST 1 VIEW: CPT

## 2019-05-04 PROCEDURE — 85025 COMPLETE CBC W/AUTO DIFF WBC: CPT

## 2019-05-04 PROCEDURE — 85379 FIBRIN DEGRADATION QUANT: CPT

## 2019-05-04 PROCEDURE — 99285 EMERGENCY DEPT VISIT HI MDM: CPT

## 2019-05-05 VITALS
BODY MASS INDEX: 37.8 KG/M2 | HEART RATE: 83 BPM | WEIGHT: 270 LBS | SYSTOLIC BLOOD PRESSURE: 168 MMHG | DIASTOLIC BLOOD PRESSURE: 97 MMHG | RESPIRATION RATE: 19 BRPM | OXYGEN SATURATION: 98 % | TEMPERATURE: 99 F | HEIGHT: 71 IN

## 2019-05-05 LAB
ATRIAL RATE: 113 BPM
CALCULATED P AXIS, ECG09: 61 DEGREES
CALCULATED R AXIS, ECG10: 28 DEGREES
CALCULATED T AXIS, ECG11: 38 DEGREES
D DIMER PPP FEU-MCNC: 0.27 UG/ML(FEU)
DIAGNOSIS, 93000: NORMAL
P-R INTERVAL, ECG05: 158 MS
Q-T INTERVAL, ECG07: 302 MS
QRS DURATION, ECG06: 72 MS
QTC CALCULATION (BEZET), ECG08: 414 MS
TROPONIN I BLD-MCNC: 0.01 NG/ML (ref 0.02–0.05)
VENTRICULAR RATE, ECG03: 113 BPM

## 2019-05-05 PROCEDURE — 96374 THER/PROPH/DIAG INJ IV PUSH: CPT

## 2019-05-05 PROCEDURE — 84484 ASSAY OF TROPONIN QUANT: CPT

## 2019-05-05 PROCEDURE — 74011250636 HC RX REV CODE- 250/636

## 2019-05-05 RX ORDER — GUAIFENESIN 100 MG/5ML
81 LIQUID (ML) ORAL DAILY
Qty: 30 TAB | Refills: 0 | Status: SHIPPED | OUTPATIENT
Start: 2019-05-05

## 2019-05-05 RX ORDER — LORAZEPAM 2 MG/ML
1 INJECTION INTRAMUSCULAR
Status: COMPLETED | OUTPATIENT
Start: 2019-05-05 | End: 2019-05-05

## 2019-05-05 RX ADMIN — LORAZEPAM 1 MG: 2 INJECTION INTRAMUSCULAR; INTRAVENOUS at 01:59

## 2019-05-05 NOTE — DISCHARGE INSTRUCTIONS

## 2019-05-05 NOTE — ED PROVIDER NOTES
65-year-old female complaining of shortness of breath onset greater than 24 hours. Patient points to the throat as the area of tightness. Patient has had this once before in the past and was seen at urgent care and at at bedtime no significant abnormalities or pathologies were identified. No fevers or chills or flulike symptoms. Patient was seen 2 days ago for same complaint had a workup was negative including serial troponins. The patient's instructed to follow up with cardiology but \"they didn't call me\". The history is provided by the patient. Shortness of Breath This is a new problem. The current episode started 2 days ago. The problem has not changed since onset. Pertinent negatives include no fever, no sputum production, no hemoptysis and no orthopnea. It is unknown what precipitated the problem. She has tried nothing for the symptoms. Past Medical History:  
Diagnosis Date  Ill-defined condition   
 hx migraines Past Surgical History:  
Procedure Laterality Date  HX TUBAL LIGATION No family history on file. Social History Socioeconomic History  Marital status: SINGLE Spouse name: Not on file  Number of children: Not on file  Years of education: Not on file  Highest education level: Not on file Occupational History  Not on file Social Needs  Financial resource strain: Not on file  Food insecurity:  
  Worry: Not on file Inability: Not on file  Transportation needs:  
  Medical: Not on file Non-medical: Not on file Tobacco Use  Smoking status: Never Smoker Substance and Sexual Activity  Alcohol use: No  
 Drug use: No  
 Sexual activity: Never Birth control/protection: Surgical  
Lifestyle  Physical activity:  
  Days per week: Not on file Minutes per session: Not on file  Stress: Not on file Relationships  Social connections:  
  Talks on phone: Not on file Gets together: Not on file Attends [employer] service: Not on file Active member of club or organization: Not on file Attends meetings of clubs or organizations: Not on file Relationship status: Not on file  Intimate partner violence:  
  Fear of current or ex partner: Not on file Emotionally abused: Not on file Physically abused: Not on file Forced sexual activity: Not on file Other Topics Concern  Not on file Social History Narrative  Not on file ALLERGIES: Patient has no known allergies. Review of Systems Constitutional: Negative. Negative for activity change and fever. HENT: Negative. Eyes: Negative. Respiratory: Positive for shortness of breath. Negative for hemoptysis and sputum production. Cardiovascular: Negative. Negative for orthopnea. Gastrointestinal: Negative. Genitourinary: Negative. Musculoskeletal: Negative. Skin: Negative. Neurological: Negative. Psychiatric/Behavioral: Negative. All other systems reviewed and are negative. Vitals:  
 05/04/19 2257 BP: 174/90 Pulse: (!) 122 Resp: 18 Temp: 99 °F (37.2 °C) SpO2: 98% Weight: 122.5 kg (270 lb) Height: 5' 11\" (1.803 m) Physical Exam  
Constitutional: She is oriented to person, place, and time. She appears well-developed and well-nourished. No distress. HENT:  
Head: Normocephalic and atraumatic. Right Ear: External ear normal.  
Left Ear: External ear normal.  
Nose: Nose normal.  
Mouth/Throat: Oropharynx is clear and moist. No oropharyngeal exudate. Eyes: Pupils are equal, round, and reactive to light. Conjunctivae and EOM are normal. Right eye exhibits no discharge. Left eye exhibits no discharge. No scleral icterus. Neck: Normal range of motion. Neck supple. No JVD present. No tracheal deviation present. Cardiovascular: Normal rate, regular rhythm and intact distal pulses. Pulmonary/Chest: Effort normal and breath sounds normal. No stridor.  No respiratory distress. She has no wheezes. She exhibits no tenderness. Abdominal: Soft. Bowel sounds are normal. She exhibits no distension and no mass. There is no tenderness. Musculoskeletal: Normal range of motion. She exhibits no edema or tenderness. Neurological: She is alert and oriented to person, place, and time. No cranial nerve deficit. Skin: Skin is warm and dry. No rash noted. She is not diaphoretic. No erythema. No pallor. Psychiatric: She has a normal mood and affect. Her behavior is normal. Thought content normal.  
Nursing note and vitals reviewed. MDM Number of Diagnoses or Management Options Diagnosis management comments: Assessment shortness of breath does not appear to be cardiac pulmonary etiology. Possibly seasonal allergies. Patient was seen 2 days earlier had a negative workup was referred to cardiology and contacted her yet she is to contact them on Monday who will be her responsibility to follow up as instructed. Amount and/or Complexity of Data Reviewed Clinical lab tests: ordered and reviewed Tests in the radiology section of CPT®: ordered and reviewed Tests in the medicine section of CPT®: reviewed and ordered Risk of Complications, Morbidity, and/or Mortality Presenting problems: high Diagnostic procedures: high Management options: high Procedures

## 2019-05-05 NOTE — ED TRIAGE NOTES
Pt walked into ed room. Pt c/o sob since yesterday and tightness in her shoulders. Pt was seen here on Thursday for chest pains. Pt c/o mild chest pain now.

## 2019-05-05 NOTE — ED NOTES
I have reviewed discharge instructions with the patient. The patient verbalized understanding. Patient left ED via Discharge Method: ambulatory to Home with (insert name of family/friend, self, transport family). Opportunity for questions and clarification provided. Patient given 1 scripts. Patient is to follow up with Cardiology on Monday 5/7/19. To continue your aftercare when you leave the hospital, you may receive an automated call from our care team to check in on how you are doing. This is a free service and part of our promise to provide the best care and service to meet your aftercare needs.  If you have questions, or wish to unsubscribe from this service please call [phone]. Thank you for Choosing our [facility] Emergency Department.

## 2019-05-10 ENCOUNTER — HOSPITAL ENCOUNTER (OUTPATIENT)
Dept: LAB | Age: 49
Discharge: HOME | End: 2019-05-10
Payer: SELF-PAY

## 2019-05-10 DIAGNOSIS — R07.9 CHEST PAIN, UNSPECIFIED TYPE: ICD-10-CM

## 2019-05-10 PROBLEM — E66.01 SEVERE OBESITY (HCC): Status: ACTIVE | Noted: 2019-05-10

## 2019-05-10 LAB
CRP SERPL-MCNC: 2.1 MG/DL (ref 0–0.9)
ERYTHROCYTE [SEDIMENTATION RATE] IN BLOOD: 56 MM/HR (ref 0–20)

## 2019-05-10 PROCEDURE — 86140 C-REACTIVE PROTEIN: CPT

## 2019-05-10 PROCEDURE — 36415 COLL VENOUS BLD VENIPUNCTURE: CPT

## 2019-05-10 PROCEDURE — 85652 RBC SED RATE AUTOMATED: CPT

## 2020-08-29 ENCOUNTER — HOSPITAL ENCOUNTER (EMERGENCY)
Age: 50
Discharge: HOME | End: 2020-08-29
Attending: EMERGENCY MEDICINE

## 2020-08-29 ENCOUNTER — APPOINTMENT (OUTPATIENT)
Dept: CT IMAGING | Age: 50
End: 2020-08-29
Attending: EMERGENCY MEDICINE

## 2020-08-29 ENCOUNTER — APPOINTMENT (OUTPATIENT)
Dept: MRI IMAGING | Age: 50
End: 2020-08-29
Attending: EMERGENCY MEDICINE

## 2020-08-29 VITALS
HEIGHT: 71 IN | HEART RATE: 116 BPM | OXYGEN SATURATION: 100 % | WEIGHT: 260 LBS | TEMPERATURE: 98.7 F | DIASTOLIC BLOOD PRESSURE: 89 MMHG | BODY MASS INDEX: 36.4 KG/M2 | RESPIRATION RATE: 16 BRPM | SYSTOLIC BLOOD PRESSURE: 165 MMHG

## 2020-08-29 DIAGNOSIS — F44.9 CONVERSION DISORDER: Primary | ICD-10-CM

## 2020-08-29 LAB
ANION GAP SERPL CALC-SCNC: 9 MMOL/L (ref 7–16)
ATRIAL RATE: 99 BPM
BASOPHILS # BLD: 0 K/UL (ref 0–0.2)
BASOPHILS NFR BLD: 0 % (ref 0–2)
BUN SERPL-MCNC: 9 MG/DL (ref 6–23)
CALCIUM SERPL-MCNC: 9.8 MG/DL (ref 8.3–10.4)
CALCULATED P AXIS, ECG09: 48 DEGREES
CALCULATED R AXIS, ECG10: 32 DEGREES
CALCULATED T AXIS, ECG11: 31 DEGREES
CHLORIDE SERPL-SCNC: 105 MMOL/L (ref 98–107)
CO2 SERPL-SCNC: 24 MMOL/L (ref 21–32)
CREAT SERPL-MCNC: 1.09 MG/DL (ref 0.6–1)
DIAGNOSIS, 93000: NORMAL
DIFFERENTIAL METHOD BLD: ABNORMAL
EOSINOPHIL # BLD: 0.1 K/UL (ref 0–0.8)
EOSINOPHIL NFR BLD: 1 % (ref 0.5–7.8)
ERYTHROCYTE [DISTWIDTH] IN BLOOD BY AUTOMATED COUNT: 14.6 % (ref 11.9–14.6)
GLUCOSE BLD STRIP.AUTO-MCNC: 129 MG/DL (ref 65–100)
GLUCOSE SERPL-MCNC: 114 MG/DL (ref 65–100)
HCT VFR BLD AUTO: 41.6 % (ref 35.8–46.3)
HGB BLD-MCNC: 13 G/DL (ref 11.7–15.4)
IMM GRANULOCYTES # BLD AUTO: 0 K/UL (ref 0–0.5)
IMM GRANULOCYTES NFR BLD AUTO: 0 % (ref 0–5)
LYMPHOCYTES # BLD: 2.3 K/UL (ref 0.5–4.6)
LYMPHOCYTES NFR BLD: 30 % (ref 13–44)
MCH RBC QN AUTO: 25 PG (ref 26.1–32.9)
MCHC RBC AUTO-ENTMCNC: 31.3 G/DL (ref 31.4–35)
MCV RBC AUTO: 80 FL (ref 79.6–97.8)
MONOCYTES # BLD: 0.5 K/UL (ref 0.1–1.3)
MONOCYTES NFR BLD: 6 % (ref 4–12)
NEUTS SEG # BLD: 4.8 K/UL (ref 1.7–8.2)
NEUTS SEG NFR BLD: 62 % (ref 43–78)
NRBC # BLD: 0 K/UL (ref 0–0.2)
P-R INTERVAL, ECG05: 196 MS
PLATELET # BLD AUTO: 363 K/UL (ref 150–450)
PMV BLD AUTO: 8.8 FL (ref 9.4–12.3)
POTASSIUM SERPL-SCNC: 3.9 MMOL/L (ref 3.5–5.1)
Q-T INTERVAL, ECG07: 324 MS
QRS DURATION, ECG06: 74 MS
QTC CALCULATION (BEZET), ECG08: 415 MS
RBC # BLD AUTO: 5.2 M/UL (ref 4.05–5.2)
SODIUM SERPL-SCNC: 138 MMOL/L (ref 136–145)
TROPONIN-HIGH SENSITIVITY: 5.5 PG/ML (ref 0–14)
VENTRICULAR RATE, ECG03: 99 BPM
WBC # BLD AUTO: 7.7 K/UL (ref 4.3–11.1)

## 2020-08-29 PROCEDURE — 93005 ELECTROCARDIOGRAM TRACING: CPT | Performed by: EMERGENCY MEDICINE

## 2020-08-29 PROCEDURE — 70551 MRI BRAIN STEM W/O DYE: CPT

## 2020-08-29 PROCEDURE — 74011000250 HC RX REV CODE- 250: Performed by: EMERGENCY MEDICINE

## 2020-08-29 PROCEDURE — 99284 EMERGENCY DEPT VISIT MOD MDM: CPT

## 2020-08-29 PROCEDURE — 80048 BASIC METABOLIC PNL TOTAL CA: CPT

## 2020-08-29 PROCEDURE — 96374 THER/PROPH/DIAG INJ IV PUSH: CPT

## 2020-08-29 PROCEDURE — 82962 GLUCOSE BLOOD TEST: CPT

## 2020-08-29 PROCEDURE — 70450 CT HEAD/BRAIN W/O DYE: CPT

## 2020-08-29 PROCEDURE — 74011250636 HC RX REV CODE- 250/636: Performed by: EMERGENCY MEDICINE

## 2020-08-29 PROCEDURE — 84484 ASSAY OF TROPONIN QUANT: CPT

## 2020-08-29 PROCEDURE — 85025 COMPLETE CBC W/AUTO DIFF WBC: CPT

## 2020-08-29 RX ADMIN — PROCHLORPERAZINE EDISYLATE 10 MG: 5 INJECTION INTRAMUSCULAR; INTRAVENOUS at 06:49

## 2020-08-29 NOTE — ED TRIAGE NOTES
Pt arrived via POV wearing a mask c/o stutter with speech that started around 0300 this AM. Pt states that she went to bed around 2300 normal and woke up at 0300 and noticed her speech was different. Pt states that her head feels \"different, I am not able to describe it. \" NIH 2 at time of triage. Per the daughter hx of same back in May with no follow up with neurology.  Denies blurred vision, fever, NVD, cp, shob

## 2020-08-29 NOTE — DISCHARGE INSTRUCTIONS
Patient Education        Learning About Apraxia of Speech  What is apraxia of speech? People who have apraxia of speech can't speak well. They understand language. They know what they want to say. And the muscles of the mouth and throat are strong enough to help them speak. But the brain isn't able to plan the order and timing needed for the muscles to move together to create speech that others can understand. What causes it? Apraxia of speech can be caused by several things. These include a stroke, an injury to the head, a brain tumor, or a disease of the nervous system. Sometimes it's caused by a condition that gets worse over time. In that case, the apraxia of speech may get worse too. What are the symptoms? If you have apraxia of speech, you may:  · Speak slowly. · Often pause or struggle to say something. · Be able to say only a few words clearly. · Be able to make single sounds, but not be able to put them together into longer words or phrases. · Sometimes say the wrong sound in a word, leave out some sounds, or make sounds in the wrong order. · Be able to say a word or phrase in some situations, but not in others. How is it diagnosed? If it looks like you have a problem like apraxia of speech, your doctor will do a physical exam. He or she will also ask questions about your past health. Your doctor may refer you to a speech-language pathologist (SLP). Your doctor or SLP may suggest other tests. These tests are used to find out if you've had a stroke, a brain tumor, an injury to your brain, or a disease of your nervous system. Your doctor may use an MRI or other imaging test. This can help find out what might be going on in your brain that is causing your speech problem. The SLP will also listen to you talk. He or she will watch how you say sounds and combinations of sounds.  The SLP will also listen to how you pause between phrases, how you put stress on parts of words, and how loudly you speak.  How is it treated? There are things your doctor or speech-language pathologist can do to help improve apraxia of speech. He or she may give you:  · Methods to help make your speech clearer. · Strategies to help you communicate better. · A computer or other device, if needed, to help you communicate. You may need frequent speech therapy sessions. Your health care team will help you decide on the best schedule for treatment. How can others help you cope? Your family and friends can help you communicate better. Share these simple tips with them. Encourage them to:  · Be patient, understanding, and supportive. · Speak directly to you and give you enough time to talk. · Limit conversations to small groups or one-on-one talks. It's best to talk in quiet places without distractions like radio or TV. · Not correct your pronunciation. · Ask you to repeat something if they don't understand. · Now and then, repeat back what you said. This helps confirm that they understand the message. What are some tips for coping? Speech disorders can make it frustrating to talk with others. But there are some things you can do to make it easier. · Give yourself time to get the words out. Be patient with yourself. · Try to slow down how fast you speak. · Be patient with others. If they have trouble understanding you, try again. · Use other methods to help listeners understand you. For example, you can use an alphabet board. Point to the first letter of each word as you say it. Or you can try adding gestures or drawing. You might also use special apps on devices such as smartphones or tablets. Follow-up care is a key part of your treatment and safety. Be sure to make and go to all appointments, and call your doctor if you are having problems. It's also a good idea to know your test results and keep a list of the medicines you take. Where can you learn more?   Go to http://vera-rito.info/  Enter A014 in the search box to learn more about \"Learning About Apraxia of Speech. \"  Current as of: November 20, 2019               Content Version: 12.5  © 2993-5596 [employer], Inc[employer]. Care instructions adapted under license by [employer] (which disclaims liability or warranty for this information). If you have questions about a medical condition or this instruction, always ask your healthcare professional. [place] [address] any warranty or liability for your use of this information.

## 2020-08-29 NOTE — ED NOTES
Pt back in room and [facility] neuro on screen for assessment.  Dr. Jamil Mart on for neurology

## 2020-08-29 NOTE — PROGRESS NOTES
This EndoRN at bedside to assess patient at 0530, pt noted to have stuttering of speech, pt A&Ox4, able to recognize objects, WOODS. NIH 1 for dysarthria. Non Contrast Head CT completed. Tele neurology not recommending tPA at this time.

## 2020-08-29 NOTE — ED NOTES
SOC completed, recommendation not to administer tPA at this time. Dr. Virginia Fontenot will contact Dr. Vera Mayer with other recommendations.

## 2020-08-29 NOTE — ED NOTES
Pt left after speaking with doctor. RN unable to discuss discharge education with pt or document vital signs.

## 2020-08-29 NOTE — ED PROVIDER NOTES
Pt arrived via POV wearing a mask c/o stutter with speech that started around 0300 this AM. Pt states that she went to bed around 2300 normal and woke up at 0300 and noticed her speech was different. Pt states that her head feels \"different, I am not able to describe it. \" NIH 2 at time of triage. Per the daughter hx of same back in May with no follow up with neurology. Denies blurred vision, fever, NVD, cp, shob. Review of medical records indicate that in May of this year patient was seen at [facility], with complaints of right upper extremity weakness not difficulty speaking. MRI was negative. The history is provided by medical records and the patient. Aphasia   This is a new problem. The current episode started 3 to 5 hours ago. The problem has not changed since onset. There was no focality noted. Primary symptoms include slurred speech. Pertinent negatives include no focal weakness, no loss of sensation, no loss of balance, no speech difficulty, no memory loss, no movement disorder, no agitation, no visual change, no auditory change, no mental status change, no unresponsiveness and no disorientation. There has been no fever. Pertinent negatives include no shortness of breath, no chest pain, no vomiting, no altered mental status, no confusion, no headaches, no choking, no nausea, no bowel incontinence and no bladder incontinence. There were no medications administered prior to arrival.        Past Medical History:   Diagnosis Date    Ill-defined condition     hx migraines       Past Surgical History:   Procedure Laterality Date    HX TUBAL LIGATION           No family history on file.     Social History     Socioeconomic History    Marital status: SINGLE     Spouse name: Not on file    Number of children: Not on file    Years of education: Not on file    Highest education level: Not on file   Occupational History    Not on file   Social Needs    Financial resource strain: Not on file    Food insecurity Worry: Not on file     Inability: Not on file    Transportation needs     Medical: Not on file     Non-medical: Not on file   Tobacco Use    Smoking status: Never Smoker    Smokeless tobacco: Never Used   Substance and Sexual Activity    Alcohol use: No    Drug use: No    Sexual activity: Never     Birth control/protection: Surgical   Lifestyle    Physical activity     Days per week: Not on file     Minutes per session: Not on file    Stress: Not on file   Relationships    Social connections     Talks on phone: Not on file     Gets together: Not on file     Attends [employer] service: Not on file     Active member of club or organization: Not on file     Attends meetings of clubs or organizations: Not on file     Relationship status: Not on file    Intimate partner violence     Fear of current or ex partner: Not on file     Emotionally abused: Not on file     Physically abused: Not on file     Forced sexual activity: Not on file   Other Topics Concern    Not on file   Social History Narrative    Not on file         ALLERGIES: Patient has no known allergies. Review of Systems   Respiratory: Negative for choking and shortness of breath. Cardiovascular: Negative for chest pain. Gastrointestinal: Negative for bowel incontinence, nausea and vomiting. Genitourinary: Negative for bladder incontinence. Neurological: Negative for focal weakness, speech difficulty, headaches and loss of balance. Psychiatric/Behavioral: Negative for agitation, confusion and memory loss. All other systems reviewed and are negative. Vitals:    08/29/20 0515   BP: 165/89   Pulse: (!) 116   Resp: 16   Temp: 98.7 °F (37.1 °C)   SpO2: 100%   Weight: 117.9 kg (260 lb)   Height: 5' 11\" (1.803 m)            Physical Exam  Vitals signs and nursing note reviewed. Constitutional:       Appearance: Normal appearance. HENT:      Head: Normocephalic and atraumatic.       Mouth/Throat:      Mouth: Mucous membranes are moist.      Pharynx: No oropharyngeal exudate or posterior oropharyngeal erythema. Eyes:      Extraocular Movements: Extraocular movements intact. Conjunctiva/sclera: Conjunctivae normal.      Pupils: Pupils are equal, round, and reactive to light. Neck:      Musculoskeletal: Normal range of motion and neck supple. Vascular: No carotid bruit. Cardiovascular:      Rate and Rhythm: Normal rate and regular rhythm. Heart sounds: Murmur present. Pulmonary:      Effort: Pulmonary effort is normal.      Breath sounds: Normal breath sounds. Abdominal:      General: There is no distension. Palpations: Abdomen is soft. Tenderness: There is no abdominal tenderness. Hernia: No hernia is present. Musculoskeletal: Normal range of motion. Skin:     General: Skin is warm and dry. Capillary Refill: Capillary refill takes less than 2 seconds. Neurological:      General: No focal deficit present. Mental Status: She is alert and oriented to person, place, and time. Mental status is at baseline. Cranial Nerves: No cranial nerve deficit. Sensory: No sensory deficit. Motor: No weakness. Coordination: Coordination normal.      Gait: Gait normal.      Deep Tendon Reflexes: Reflexes normal.      Comments: Mild stuttering of speech noted   Psychiatric:         Mood and Affect: Mood normal.         Behavior: Behavior normal.         Thought Content: Thought content normal.          MDM  Number of Diagnoses or Management Options  Diagnosis management comments: Differential diagnosis: CVA, TIA, conversion disorder. Amount and/or Complexity of Data Reviewed  Clinical lab tests: ordered and reviewed  Tests in the radiology section of CPT®: ordered and reviewed  Review and summarize past medical records: yes  Discuss the patient with other providers: yes (Discussed with neurologist and specialist on-call.   Recommended MRI to rule out CVA although given the patient stuttering most likely conversion disorder.)  Independent visualization of images, tracings, or specimens: yes    Risk of Complications, Morbidity, and/or Mortality  Presenting problems: high  Diagnostic procedures: moderate  Management options: moderate    Patient Progress  Patient progress: stable         Procedures

## 2020-08-29 NOTE — ED NOTES
Pt ambulatory to bathroom and states that she is on her menstrual cycle and messed the cup up so no urine sample was collected

## 2021-01-22 ENCOUNTER — HOSPITAL ENCOUNTER (OUTPATIENT)
Age: 51
Setting detail: OBSERVATION
Discharge: HOME | End: 2021-01-22
Attending: EMERGENCY MEDICINE | Admitting: FAMILY MEDICINE
Payer: COMMERCIAL

## 2021-01-22 ENCOUNTER — APPOINTMENT (OUTPATIENT)
Dept: CT IMAGING | Age: 51
End: 2021-01-22
Attending: EMERGENCY MEDICINE
Payer: COMMERCIAL

## 2021-01-22 ENCOUNTER — APPOINTMENT (OUTPATIENT)
Dept: MRI IMAGING | Age: 51
End: 2021-01-22
Attending: FAMILY MEDICINE
Payer: COMMERCIAL

## 2021-01-22 VITALS
OXYGEN SATURATION: 97 % | DIASTOLIC BLOOD PRESSURE: 83 MMHG | HEIGHT: 71 IN | BODY MASS INDEX: 36.22 KG/M2 | WEIGHT: 258.7 LBS | HEART RATE: 92 BPM | TEMPERATURE: 97.1 F | RESPIRATION RATE: 16 BRPM | SYSTOLIC BLOOD PRESSURE: 116 MMHG

## 2021-01-22 DIAGNOSIS — R29.90 STROKE-LIKE SYMPTOM: Primary | ICD-10-CM

## 2021-01-22 PROBLEM — E66.01 SEVERE OBESITY (HCC): Chronic | Status: ACTIVE | Noted: 2019-05-10

## 2021-01-22 PROBLEM — G43.009 MIGRAINE WITHOUT AURA: Chronic | Status: ACTIVE | Noted: 2021-01-22

## 2021-01-22 PROBLEM — G45.9 TIA (TRANSIENT ISCHEMIC ATTACK): Status: ACTIVE | Noted: 2021-01-22

## 2021-01-22 LAB
ANION GAP SERPL CALC-SCNC: 10 MMOL/L (ref 7–16)
ATRIAL RATE: 130 BPM
BASOPHILS # BLD: 0.1 K/UL (ref 0–0.2)
BASOPHILS NFR BLD: 1 % (ref 0–2)
BUN SERPL-MCNC: 15 MG/DL (ref 6–23)
CALCIUM SERPL-MCNC: 9.5 MG/DL (ref 8.3–10.4)
CALCULATED P AXIS, ECG09: 65 DEGREES
CALCULATED R AXIS, ECG10: 54 DEGREES
CALCULATED T AXIS, ECG11: 19 DEGREES
CHLORIDE SERPL-SCNC: 108 MMOL/L (ref 98–107)
CO2 SERPL-SCNC: 21 MMOL/L (ref 21–32)
CREAT SERPL-MCNC: 1.1 MG/DL (ref 0.6–1)
DIAGNOSIS, 93000: NORMAL
DIFFERENTIAL METHOD BLD: ABNORMAL
EOSINOPHIL # BLD: 0.2 K/UL (ref 0–0.8)
EOSINOPHIL NFR BLD: 2 % (ref 0.5–7.8)
ERYTHROCYTE [DISTWIDTH] IN BLOOD BY AUTOMATED COUNT: 14.6 % (ref 11.9–14.6)
GLUCOSE BLD STRIP.AUTO-MCNC: 109 MG/DL (ref 65–100)
GLUCOSE BLD STRIP.AUTO-MCNC: 118 MG/DL (ref 65–100)
GLUCOSE BLD STRIP.AUTO-MCNC: 121 MG/DL (ref 65–100)
GLUCOSE BLD STRIP.AUTO-MCNC: 125 MG/DL (ref 65–100)
GLUCOSE SERPL-MCNC: 117 MG/DL (ref 65–100)
HCT VFR BLD AUTO: 40.6 % (ref 35.8–46.3)
HGB BLD-MCNC: 12.5 G/DL (ref 11.7–15.4)
IMM GRANULOCYTES # BLD AUTO: 0 K/UL (ref 0–0.5)
IMM GRANULOCYTES NFR BLD AUTO: 0 % (ref 0–5)
INR BLD: 1.2 (ref 0.9–1.2)
INR PPP: 1
LYMPHOCYTES # BLD: 4.9 K/UL (ref 0.5–4.6)
LYMPHOCYTES NFR BLD: 46 % (ref 13–44)
MCH RBC QN AUTO: 24.8 PG (ref 26.1–32.9)
MCHC RBC AUTO-ENTMCNC: 30.8 G/DL (ref 31.4–35)
MCV RBC AUTO: 80.4 FL (ref 79.6–97.8)
MONOCYTES # BLD: 0.8 K/UL (ref 0.1–1.3)
MONOCYTES NFR BLD: 8 % (ref 4–12)
NEUTS SEG # BLD: 4.7 K/UL (ref 1.7–8.2)
NEUTS SEG NFR BLD: 44 % (ref 43–78)
NRBC # BLD: 0 K/UL (ref 0–0.2)
P-R INTERVAL, ECG05: 162 MS
PLATELET # BLD AUTO: 418 K/UL (ref 150–450)
PMV BLD AUTO: 8.9 FL (ref 9.4–12.3)
POTASSIUM SERPL-SCNC: 3.6 MMOL/L (ref 3.5–5.1)
PROTHROMBIN TIME: 13.3 SEC (ref 12.5–14.7)
PT BLD: 13.9 SECS (ref 9.6–11.6)
Q-T INTERVAL, ECG07: 288 MS
QRS DURATION, ECG06: 78 MS
QTC CALCULATION (BEZET), ECG08: 423 MS
RBC # BLD AUTO: 5.05 M/UL (ref 4.05–5.2)
SODIUM SERPL-SCNC: 139 MMOL/L (ref 136–145)
VENTRICULAR RATE, ECG03: 130 BPM
WBC # BLD AUTO: 10.7 K/UL (ref 4.3–11.1)

## 2021-01-22 PROCEDURE — 93005 ELECTROCARDIOGRAM TRACING: CPT | Performed by: EMERGENCY MEDICINE

## 2021-01-22 PROCEDURE — 99218 HC RM OBSERVATION: CPT

## 2021-01-22 PROCEDURE — 93306 TTE W/DOPPLER COMPLETE: CPT

## 2021-01-22 PROCEDURE — 82962 GLUCOSE BLOOD TEST: CPT

## 2021-01-22 PROCEDURE — 96374 THER/PROPH/DIAG INJ IV PUSH: CPT

## 2021-01-22 PROCEDURE — 74011250636 HC RX REV CODE- 250/636: Performed by: FAMILY MEDICINE

## 2021-01-22 PROCEDURE — 74011250637 HC RX REV CODE- 250/637: Performed by: FAMILY MEDICINE

## 2021-01-22 PROCEDURE — 85025 COMPLETE CBC W/AUTO DIFF WBC: CPT

## 2021-01-22 PROCEDURE — 74011250636 HC RX REV CODE- 250/636: Performed by: EMERGENCY MEDICINE

## 2021-01-22 PROCEDURE — 70450 CT HEAD/BRAIN W/O DYE: CPT

## 2021-01-22 PROCEDURE — 96372 THER/PROPH/DIAG INJ SC/IM: CPT

## 2021-01-22 PROCEDURE — 97165 OT EVAL LOW COMPLEX 30 MIN: CPT

## 2021-01-22 PROCEDURE — 99285 EMERGENCY DEPT VISIT HI MDM: CPT

## 2021-01-22 PROCEDURE — 85610 PROTHROMBIN TIME: CPT

## 2021-01-22 PROCEDURE — 80048 BASIC METABOLIC PNL TOTAL CA: CPT

## 2021-01-22 PROCEDURE — 70551 MRI BRAIN STEM W/O DYE: CPT

## 2021-01-22 PROCEDURE — 97161 PT EVAL LOW COMPLEX 20 MIN: CPT

## 2021-01-22 PROCEDURE — 92610 EVALUATE SWALLOWING FUNCTION: CPT

## 2021-01-22 RX ORDER — PANTOPRAZOLE SODIUM 40 MG/1
40 TABLET, DELAYED RELEASE ORAL
Status: DISCONTINUED | OUTPATIENT
Start: 2021-01-22 | End: 2021-01-22 | Stop reason: HOSPADM

## 2021-01-22 RX ORDER — ONDANSETRON 2 MG/ML
4 INJECTION INTRAMUSCULAR; INTRAVENOUS
Status: DISCONTINUED | OUTPATIENT
Start: 2021-01-22 | End: 2021-01-22 | Stop reason: HOSPADM

## 2021-01-22 RX ORDER — ENOXAPARIN SODIUM 100 MG/ML
40 INJECTION SUBCUTANEOUS EVERY 24 HOURS
Status: DISCONTINUED | OUTPATIENT
Start: 2021-01-22 | End: 2021-01-22 | Stop reason: HOSPADM

## 2021-01-22 RX ORDER — ATORVASTATIN CALCIUM 40 MG/1
40 TABLET, FILM COATED ORAL
Status: DISCONTINUED | OUTPATIENT
Start: 2021-01-22 | End: 2021-01-22 | Stop reason: HOSPADM

## 2021-01-22 RX ORDER — GUAIFENESIN 100 MG/5ML
81 LIQUID (ML) ORAL DAILY
Status: DISCONTINUED | OUTPATIENT
Start: 2021-01-22 | End: 2021-01-22 | Stop reason: HOSPADM

## 2021-01-22 RX ORDER — BISACODYL 5 MG
5 TABLET, DELAYED RELEASE (ENTERIC COATED) ORAL DAILY PRN
Status: DISCONTINUED | OUTPATIENT
Start: 2021-01-22 | End: 2021-01-22 | Stop reason: HOSPADM

## 2021-01-22 RX ORDER — LORAZEPAM 2 MG/ML
0.5 INJECTION INTRAMUSCULAR
Status: COMPLETED | OUTPATIENT
Start: 2021-01-22 | End: 2021-01-22

## 2021-01-22 RX ORDER — ACETAMINOPHEN 325 MG/1
650 TABLET ORAL
Status: DISCONTINUED | OUTPATIENT
Start: 2021-01-22 | End: 2021-01-22 | Stop reason: HOSPADM

## 2021-01-22 RX ORDER — SODIUM CHLORIDE 0.9 % (FLUSH) 0.9 %
5-40 SYRINGE (ML) INJECTION AS NEEDED
Status: DISCONTINUED | OUTPATIENT
Start: 2021-01-22 | End: 2021-01-22 | Stop reason: HOSPADM

## 2021-01-22 RX ORDER — SODIUM CHLORIDE 0.9 % (FLUSH) 0.9 %
5-40 SYRINGE (ML) INJECTION EVERY 8 HOURS
Status: DISCONTINUED | OUTPATIENT
Start: 2021-01-22 | End: 2021-01-22 | Stop reason: HOSPADM

## 2021-01-22 RX ADMIN — ASPIRIN 81 MG: 81 TABLET, CHEWABLE ORAL at 08:54

## 2021-01-22 RX ADMIN — LORAZEPAM 0.5 MG: 2 INJECTION INTRAMUSCULAR; INTRAVENOUS at 02:05

## 2021-01-22 RX ADMIN — Medication 10 ML: at 06:07

## 2021-01-22 RX ADMIN — PANTOPRAZOLE SODIUM 40 MG: 40 TABLET, DELAYED RELEASE ORAL at 06:07

## 2021-01-22 RX ADMIN — ACETAMINOPHEN 650 MG: 325 TABLET, FILM COATED ORAL at 11:49

## 2021-01-22 RX ADMIN — ENOXAPARIN SODIUM 40 MG: 40 INJECTION SUBCUTANEOUS at 08:54

## 2021-01-22 NOTE — ROUTINE PROCESS
TRANSFER - IN REPORT: 
 
Verbal report received from Ronaldo Jefferson RN(name) on Rajiv Albert  being received from ED(unit) for routine progression of care Report consisted of patients Situation, Background, Assessment and  
Recommendations(SBAR). Information from the following report(s) SBAR, ED Summary, [facility] and Recent Results was reviewed with the receiving nurse. Opportunity for questions and clarification was provided. Assessment completed upon patients arrival to unit and care assumed. Primary Nurse Rosanne Jim and Ramirez Coburn RN performed a dual skin assessment on this patient No impairment noted Liu score is 22

## 2021-01-22 NOTE — DISCHARGE SUMMARY
[address] Hospitalist Discharge Summary    Patient ID:  Brandie Salazar. female. 1970.  [id number]    Admit date: 1/22/2021  1:24 AM    Discharge date: 01/22/21     Admitting Physician: Greg Morris MD  Attending Physician: Jess Mariano DO  Primary Care Physician: Mikhail Cardozo AlaBanner     Discharge Physician: Jaun Severe, DO    Discharged Condition: Stable    Indication for Admission:   Chief Complaint   Patient presents with    Numbness        Reason for hospitalization:   Patient Active Problem List   Diagnosis Code    Severe obesity ([facility] [address] E66.01    TIA (transient ischemic attack) G45.9    Migraine without aura G43.009       Discharge Diagnosis:   TIA vs. Complex migrant   Severe obesity    Discharge Disposition: home  Follow up Instructions: f/u with neurology  Did Patient have Sepsis (YES OR NO): no     Hospital Course:   52 y.o. female presented with    due to difficulty \"getting her words out\". Essentially she is having significant stuttering, which she does not have at baseline. She also reported some numbness to her left leg. Denies any focal weaknesses, headaches, vision changes, difficulty swallowing. She reports a history of TIA. This history is pretty much the same thing she is complaining of tonight. She has had 2-3 other episodes within the last year. She has had extensive work-ups with no etiology found. She does report a history of migraines as well. She denies a headache at present and states that she has never had neurological symptoms with her migraines. She has seen neurology in the past but not for these particular symptoms, just for her migraine. Stroke alert was called and ultimately telemetry neurology felt that patient should have an MRI before being discharged.   Since MRI capabilities were not available overnight, we were asked to admit to continue her work-up and observation    A/P  Mri unremarkable, above symptoms vague, TIA vs. complex migraine vs. Partial seizure, neurology rec TIA worktup which is all negative,   Pt request to be discharged now, and is advised to see pcp for neurology referral asap     Discharge Exam:  Visit Vitals  /83   Pulse 92   Temp 97.1 °F (36.2 °C)   Resp 16   Ht 5' 11\" (1.803 m)   Wt 117.3 kg (258 lb 11.2 oz)   SpO2 97%   BMI 36.08 kg/m²      General: No acute distress, speaking in full sentences, no use of accessory muscles   HEENT: Pupils equal and reactive to light and accommodation, oropharynx is clear   Neck: Supple, no lymphadenopathy, no JVD   Lungs: Clear to auscultation bilaterally   Cardiovascular: Regular rate and rhythm with normal S1 and S2   Abdomen: Soft, nontender, nondistended, normoactive bowel sounds   Extremities: No cyanosis clubbing or edema   Neuro: Nonfocal, A&O x3   Psych: Normal affect     Consults: None    Code Status: Full Code    Significant Diagnostic Studies:   CMP:   Lab Results   Component Value Date/Time     01/22/2021 01:33 AM    K 3.6 01/22/2021 01:33 AM     (H) 01/22/2021 01:33 AM    CO2 21 01/22/2021 01:33 AM    AGAP 10 01/22/2021 01:33 AM     (H) 01/22/2021 01:33 AM    BUN 15 01/22/2021 01:33 AM    CREA 1.10 (H) 01/22/2021 01:33 AM    GFRAA >60 01/22/2021 01:33 AM    GFRNA 56 (L) 01/22/2021 01:33 AM    CA 9.5 01/22/2021 01:33 AM         CBC:    Lab Results   Component Value Date/Time    WBC 10.7 01/22/2021 01:33 AM    HGB 12.5 01/22/2021 01:33 AM    HCT 40.6 01/22/2021 01:33 AM     01/22/2021 01:33 AM       Lab Results   Component Value Date/Time    INR 1.0 01/22/2021 01:33 AM    INR (POC) 1.2 01/22/2021 01:34 AM    Prothrombin time 13.3 01/22/2021 01:33 AM       ABG:  No results found for: PH, PHI, PCO2, PCO2I, PO2, PO2I, HCO3, HCO3I, FIO2, FIO2I        Lab Results   Component Value Date/Time    BNP 9 06/16/2018 08:21 PM           Radiology Reports :   [unfilled]      Discharge Medications:  Current Discharge Medication List      CONTINUE these medications which have NOT CHANGED    Details   atorvastatin (LIPITOR) 40 mg tablet atorvastatin 40 mg tablet   Take 1 tablet every day by oral route at bedtime for 30 days. ergocalciferol (ERGOCALCIFEROL) 1,250 mcg (50,000 unit) capsule Vitamin D2 1,250 mcg (50,000 unit) capsule   Take 1 capsule(s) EVERY WEEK by oral route for 8 weeks      omeprazole (PRILOSEC) 40 mg capsule omeprazole 40 mg capsule,delayed release   Take 1 capsule every day by oral route in the morning for 30 days. take 30 minutes prior to meals. naproxen sodium (ALEVE) 220 mg cap Take  by mouth as needed. aspirin 81 mg chewable tablet Take 1 Tab by mouth daily. Qty: 30 Tab, Refills: 0      ferrous sulfate 325 mg (65 mg iron) tablet Take 1 Tab by mouth Daily (before breakfast). Qty: 30 Tab, Refills: 0      furosemide (LASIX) 20 mg tablet Take 1 Tab by mouth daily. Qty: 10 Tab, Refills: 0             Medications Discontinued during this hospitalization: There are no discontinued medications. Patient Instructions: Activity: as tolerated. Diet:   DIET CARDIAC Regular    Therapy Ordered:  No therapy plan of the specified type found. Follow-up appointments: Follow-up Appointments   Procedures    FOLLOW UP VISIT Appointment in: One Week F/u with you pcp for neurology referral please     F/u with you pcp for neurology referral please     Standing Status:   Standing     Number of Occurrences:   1     Order Specific Question:   Appointment in     Answer: One Week       Time Spent on Discharge greater than 30 minutes.     Channing Michaels DO  1/22/2021 5:08 PM

## 2021-01-22 NOTE — PROGRESS NOTES
SPEECH LANGUAGE PATHOLOGY: DYSPHAGIA- Initial Assessment    NAME/AGE/GENDER: Winsome Perez is a 52 y.o. female  DATE: 1/22/2021  PRIMARY DIAGNOSIS: TIA (transient ischemic attack) [G45.9]      ICD-10: Treatment Diagnosis: R13.12 Dysphagia, Oropharyngeal Phase    RECOMMENDATIONS   DIET:    PO:  Regular   Liquids:  regular thin    MEDICATIONS: With liquid     ASPIRATION PRECAUTIONS  · Slow rate of intake  · Small bites/sips  · Upright at 90 degrees during meal     COMPENSATORY STRATEGIES/MODIFICATIONS  · None     RECOMMENDATIONS for CONTINUED SPEECH THERAPY:   YES: Anticipate need for ongoing speech therapy pending results of MRI. ASSESSMENT   Patient presents with oropharyngeal swallow function that is within normal limits. No dysphagia identified. Recommend regular diet/thin liquids. Medications whole with liquid wash. No dysphagia needs. Patient admitted with changes in speech pattern that she describes as stuttering. Associated tightness in chest and SOB. She reports symptoms have now resolved. CT negative for acute changes. MRI pending. Will follow up with full speech assessment if indicated by imaging results; however, do not anticipate need for ongoing speech treatment as symptoms have resolved. CONTINUATION OF SKILLED SERVICES/MEDICAL NECESSITY:   Patient continues to require skilled intervention due to possible speech evaluation if indicated by imaging results. .   EDUCATION:  · Recommendations discussed with Patient and RN  REHABILITATION POTENTIAL FOR STATED GOALS: Excellent    PLAN    FREQUENCY/DURATION: Speech therapy to follow up for assessment of speech function if indicated by imaging results. - Recommendations for next treatment session: Next treatment will address possible speech assessment if indicated by MRI. SUBJECTIVE   Pleasant and cooperative. States she feels \"much better\" today.  Speech symptoms have resolved    Oxygen Device: room air  Pain: Pain Scale 1: Numeric (0 - 10)  Pain Intensity 1: 0    History of Present Injury/Illness: Ms. Davis Maurice  has a past medical history of Ill-defined condition, Migraine without aura (1/22/2021), and TIA (transient ischemic attack) (1/22/2021). She also has no past medical history of Arthritis, Asthma, Autoimmune disease ([facility] [address] CAD (coronary artery disease), Cancer ([facility] [address] Chronic kidney disease, COPD, Dementia, Diabetes ([facility] [address] Endocrine disease, Gastrointestinal disorder, Heart failure ([facility] [address] Hypertension, Infectious disease, Liver disease, Psychiatric disorder, PUD (peptic ulcer disease), Seizures ([facility] [address] Sleep disorder, or Thromboembolus ([facility] [address] . She also  has a past surgical history that includes hx tubal ligation. PRECAUTIONS/ALLERGIES: Patient has no known allergies. Problem List:  (Impairments causing functional limitations):  1. Oropharyngeal dysphagia- No symptoms identified      Previous Dysphagia: NONE REPORTED  Diet Prior to Evaluation: Regular diet/thin liquids    Orientation:  Person  Place  Time  Situation    Cognitive-Linguistic Screening:   Speech Production:   o 100% intelligible. No fluency issues noted   Expressive Language:  o WNL   Receptive Language:  o WNL   Cognition:   o WNL  Prior Level of Function: Independent  Recommendations: Given results of screening, patient appears to be functioning at baseline. However imaging results are still pending. Will follow up for further assessment as indicated by findings. OBJECTIVE   Oral Motor:   · Labial: No impairment  · Dentition: Intact  · Oral Hygiene: Adequate  · Lingual: No impairment    Swallow evaluation:   Patient presented with thin liquid via cup and straw, puree, mixed, and solid consistencies. Appropriate oral prep with all textures. Timely swallow initiation, and single swallows upon palpation. Adequate oral clearing. No overt signs or symptoms of airway compromise observed with liquid or solid textures.        Tool Used: Dysphagia Outcome and Severity Scale (SHILPA)    Score Comments   Normal Diet  [] 7 With no strategies or extra time needed   Functional Swallow  [] 6 May have mild oral or pharyngeal delay   Mild Dysphagia  [] 5 Which may require one diet consistency restricted    Mild-Moderate Dysphagia  [] 4 With 1-2 diet consistencies restricted   Moderate Dysphagia  [] 3 With 2 or more diet consistencies restricted   Moderate-Severe Dysphagia  [] 2 With partial PO strategies (trials with ST only)   Severe Dysphagia  [] 1 With inability to tolerate any PO safely      Score:  Initial: 7 Most Recent: 7 (Date 01/22/21 )   Interpretation of Tool: The Dysphagia Outcome and Severity Scale (SHILPA) is a simple, easy-to-use, 7-point scale developed to systematically rate the functional severity of dysphagia based on objective assessment and make recommendations for diet level, independence level, and type of nutrition. Current Medications:   No current facility-administered medications on file prior to encounter. Current Outpatient Medications on File Prior to Encounter   Medication Sig Dispense Refill    atorvastatin (LIPITOR) 40 mg tablet atorvastatin 40 mg tablet   Take 1 tablet every day by oral route at bedtime for 30 days.  ergocalciferol (ERGOCALCIFEROL) 1,250 mcg (50,000 unit) capsule Vitamin D2 1,250 mcg (50,000 unit) capsule   Take 1 capsule(s) EVERY WEEK by oral route for 8 weeks      omeprazole (PRILOSEC) 40 mg capsule omeprazole 40 mg capsule,delayed release   Take 1 capsule every day by oral route in the morning for 30 days. take 30 minutes prior to meals.  naproxen sodium (ALEVE) 220 mg cap Take  by mouth as needed.  aspirin 81 mg chewable tablet Take 1 Tab by mouth daily. 30 Tab 0    ferrous sulfate 325 mg (65 mg iron) tablet Take 1 Tab by mouth Daily (before breakfast). 30 Tab 0    furosemide (LASIX) 20 mg tablet Take 1 Tab by mouth daily.  (Patient taking differently: Take 20 mg by mouth as needed.) 10 Tab 0 INTERDISCIPLINARY COLLABORATION: RN    After treatment position/precautions:  · Upright in bed  · RN notified    Total Treatment Duration:   Time In: 0900  Time Out: [address] CCC-SLP

## 2021-01-22 NOTE — ED TRIAGE NOTES
Pt coming in from lobby. Pt having a hard time getting her words out. Pt states it started 0100. States hx of TIA. States some numbness in her left leg. Denies numbness in her face and arms. Denies blood thinners MD All Cutler to triage.

## 2021-01-22 NOTE — PROGRESS NOTES
01/22/21 0330   NIH Stroke Scale   Interval Other (comment)   LOC 0   LOC Questions 0   LOC Commands 0   Best Gaze 0   Visual 0   Facial Palsy 0   Motor Right Arm 0   Motor Left Arm 0   Motor Right Leg 0   Motor Left Leg 0   Limb Ataxia 0   Sensory 0   Best Language 0   Dysarthria 0   Extinction and Inattention 0   Total 0

## 2021-01-22 NOTE — H&P
HOSPITALIST H&P  NAME:  Ancelmo Molina   Age:  52 y.o.  :   1970   MRN:   [id number]  PCP: Juancho Garcia  Treatment Team: Attending Provider: Minnie Sifuentes MD; Primary Nurse: Danish Wheeler RN    No Order     CC: Reason for admission is: Possible TIA    HPI:   Patient history was obtained from the ER provider prior to seeing the patient. Patient is a 52 y.o. female who presents to the ER due to difficulty \"getting her words out\". Essentially she is having significant stuttering, which she does not have at baseline. She also reported some numbness to her left leg. Denies any focal weaknesses, headaches, vision changes, difficulty swallowing. She reports a history of TIA. This history is pretty much the same thing she is complaining of tonight. She has had 2-3 other episodes within the last year. She has had extensive work-ups with no etiology found. She does report a history of migraines as well. She denies a headache at present and states that she has never had neurological symptoms with her migraines. She has seen neurology in the past but not for these particular symptoms, just for her migraine. Stroke alert was called and ultimately telemetry neurology felt that patient should have an MRI before being discharged. Since MRI capabilities were not available overnight, we were asked to admit to continue her work-up and observation. Patient denies any fever/chills, NVD, chest pain, abdominal pain, cough, shortness of breath. She does feel that she is improving since the time of her arrival.  ER staff notes that her stuttering has improved as well. ROS:  All systems have been reviewed and are negative except as stated in HPI or elsewhere.       Past Medical History:   Diagnosis Date    Ill-defined condition     hx migraines    Migraine without aura 2021    TIA (transient ischemic attack) 2021      Past Surgical History:   Procedure Laterality Date    HX TUBAL LIGATION        Social History     Tobacco Use    Smoking status: Never Smoker    Smokeless tobacco: Never Used   Substance Use Topics    Alcohol use: No      No family history on file. FH Reviewed and non-contributory to admitting diagnosis    No Known Allergies   Prior to Admission Medications   Prescriptions Last Dose Informant Patient Reported? Taking?   aspirin 81 mg chewable tablet   No No   Sig: Take 1 Tab by mouth daily. atorvastatin (LIPITOR) 40 mg tablet   Yes No   Sig: atorvastatin 40 mg tablet   Take 1 tablet every day by oral route at bedtime for 30 days. ergocalciferol (ERGOCALCIFEROL) 1,250 mcg (50,000 unit) capsule   Yes No   Sig: Vitamin D2 1,250 mcg (50,000 unit) capsule   Take 1 capsule(s) EVERY WEEK by oral route for 8 weeks   ferrous sulfate 325 mg (65 mg iron) tablet   No No   Sig: Take 1 Tab by mouth Daily (before breakfast). furosemide (LASIX) 20 mg tablet   No No   Sig: Take 1 Tab by mouth daily. Patient taking differently: Take 20 mg by mouth as needed. naproxen sodium (ALEVE) 220 mg cap   Yes No   Sig: Take  by mouth as needed. omeprazole (PRILOSEC) 40 mg capsule   Yes No   Sig: omeprazole 40 mg capsule,delayed release   Take 1 capsule every day by oral route in the morning for 30 days. take 30 minutes prior to meals. Facility-Administered Medications: None         Objective:     No intake or output data in the 24 hours ending 21 0331   Temp (24hrs), Av.1 °F (36.7 °C), Min:98.1 °F (36.7 °C), Max:98.1 °F (36.7 °C)    Oxygen Therapy  O2 Sat (%): 99 % (21)  Pulse via Oximetry: 102 beats per minute (21)  O2 Device: Room air (21 0139)   Body mass index is 36.26 kg/m².   Patient Vitals for the past 24 hrs:   Temp Pulse Resp BP SpO2   21 0320  (!) 103 17 139/74 99 %   21 0316  98 20  98 %   21 0259  (!) 103 18 (!) 148/77 97 %   21 0239  (!) 101 19 (!) 141/74 97 %   21 0220  98 18 (!) 156/84 98 %   01/22/21 0159  (!) 116 18 (!) 187/73 100 %   01/22/21 0149  (!) 139  (!) 189/88 99 %   01/22/21 0126 98.1 °F (36.7 °C) (!) 151 16 (!) 161/101 98 %     Physical Exam:    General:    WD and WN, No apparent distress. Head:   Normocephalic, without obvious abnormality, atraumatic. Eyes:  PERRL; EOMI; sclera normal/non-icteric  ENT:  Hearing is normal.  Oropharynx is clear with tacky mucous membranes   Resp:    Clear to auscultation bilaterally. No Wheezing or Rhonchi. Resp are even and unlabored  Heart[de-identified]  Regular rate and rhythm,  no murmur,   No LE edema  Abdomen:   Soft, non-tender. Not distended. Bowel sounds normal.  hepato-splenomegaly cannot be assessed due to obesity     Musc/SK: Muscle strength is good and tone normal; No cyanosis. No clubbing  Skin:     Texture, turgor normal. No significant rashes or lesions. Capillary refill < 2 sec  Neurologic: CN II - XII are grossly intact - Eye exam as noted above; she does have stuttering noted with the beginning of most of her answers.   Psych: Alert and oriented x 4;  Judgement and insight are normal     Data Review:   Recent Results (from the past 24 hour(s))   GLUCOSE, POC    Collection Time: 01/22/21  1:31 AM   Result Value Ref Range    Glucose (POC) 125 (H) 65 - 541 mg/dL   METABOLIC PANEL, BASIC    Collection Time: 01/22/21  1:33 AM   Result Value Ref Range    Sodium 139 136 - 145 mmol/L    Potassium 3.6 3.5 - 5.1 mmol/L    Chloride 108 (H) 98 - 107 mmol/L    CO2 21 21 - 32 mmol/L    Anion gap 10 7 - 16 mmol/L    Glucose 117 (H) 65 - 100 mg/dL    BUN 15 6 - 23 MG/DL    Creatinine 1.10 (H) 0.6 - 1.0 MG/DL    GFR est AA >60 >60 ml/min/1.73m2    GFR est non-AA 56 (L) >60 ml/min/1.73m2    Calcium 9.5 8.3 - 10.4 MG/DL   CBC WITH AUTOMATED DIFF    Collection Time: 01/22/21  1:33 AM   Result Value Ref Range    WBC 10.7 4.3 - 11.1 K/uL    RBC 5.05 4.05 - 5.2 M/uL    HGB 12.5 11.7 - 15.4 g/dL    HCT 40.6 35.8 - 46.3 %    MCV 80.4 79.6 - 97.8 FL MCH 24.8 (L) 26.1 - 32.9 PG    MCHC 30.8 (L) 31.4 - 35.0 g/dL    RDW 14.6 11.9 - 14.6 %    PLATELET 899 677 - 856 K/uL    MPV 8.9 (L) 9.4 - 12.3 FL    ABSOLUTE NRBC 0.00 0.0 - 0.2 K/uL    DF AUTOMATED      NEUTROPHILS 44 43 - 78 %    LYMPHOCYTES 46 (H) 13 - 44 %    MONOCYTES 8 4.0 - 12.0 %    EOSINOPHILS 2 0.5 - 7.8 %    BASOPHILS 1 0.0 - 2.0 %    IMMATURE GRANULOCYTES 0 0.0 - 5.0 %    ABS. NEUTROPHILS 4.7 1.7 - 8.2 K/UL    ABS. LYMPHOCYTES 4.9 (H) 0.5 - 4.6 K/UL    ABS. MONOCYTES 0.8 0.1 - 1.3 K/UL    ABS. EOSINOPHILS 0.2 0.0 - 0.8 K/UL    ABS. BASOPHILS 0.1 0.0 - 0.2 K/UL    ABS. IMM. GRANS. 0.0 0.0 - 0.5 K/UL   PROTHROMBIN TIME + INR    Collection Time: 01/22/21  1:33 AM   Result Value Ref Range    Prothrombin time 13.3 12.5 - 14.7 sec    INR 1.0     POC PT/INR    Collection Time: 01/22/21  1:34 AM   Result Value Ref Range    Prothrombin time (POC) 13.9 (H) 9.6 - 11.6 SECS    INR (POC) 1.2 0.9 - 1.2       CXR Results  (Last 48 hours)    None        CT Results  (Last 48 hours)               01/22/21 0141  CT CODE NEURO HEAD WO CONTRAST Final result    Impression:  Normal noncontrast CT head. Narrative:  EXAM: Noncontrast CT head. INDICATION: Left leg numbness. COMPARISON: Prior MRI brain on August 29, 2020. TECHNIQUE: Noncontrast CT images of the head were obtained. Radiation dose   reduction techniques were used for this study. Our CT scanners use one or all   of the following:  Automated exposure control, adjustment of the mA or kV   according to patient size, iterative reconstruction. FINDINGS: Brain volume is appropriate for age. No acute infarct, hemorrhage or   mass is identified. There is no mass effect, midline shift or depressed   fracture. The visualized paranasal sinuses and mastoid air cells are clear. Assessment and Plan:      Active Hospital Problems    Diagnosis Date Noted    TIA (transient ischemic attack) 01/22/2021    Migraine without aura 01/22/2021    Severe obesity ([facility] [address] 05/10/2019     Active Problems:    Severe obesity ([facility] [address] (5/10/2019)     pt; increases morbidity and mortality; can impede treatment and recovery      TIA (transient ischemic attack) (1/22/2021)  Will proceed with stroke work-up as recommended by tele neurology. Get MRI, echo, labs in the morning, we will have PT OT and speech evaluate her as well. Migraine without aura (1/22/2021)  No current headache. I wonder if her current symptoms could be related to a migraine. I did advise her to follow-up with her neurologist, as I do not think she will warrant inpatient neurology. · PLAN General  · DVT prophylaxis:  Lovenox  · Code status: Full;  HCPOA:   · Risk: high  · Anticipated DC needs:  · Estimated LOS: Less than 2 midnights  · Plans discussed with patient and/or caregiver; questions answered. Parts of this document were created using dragon voice recognition software.  The chart has been reviewed but errors may still be present    Med records reviewed if applicable; findings:     Critical care time if applicable:      Signed By: Prince Pruitt MD     January 22, 2021

## 2021-01-22 NOTE — ED NOTES
TRANSFER - OUT REPORT:    Verbal report given to Ria Restrepo, RN (name) on Krunal Blackburn  being transferred to room 1108 for routine progression of care       Report consisted of patients Situation, Background, Assessment and   Recommendations(SBAR). Information from the following report(s) SBAR, Kardex, ED Summary, [facility] and Recent Results was reviewed with the receiving nurse. Lines:   Peripheral IV 01/22/21 Right Antecubital (Active)        Opportunity for questions and clarification was provided.       Patient transported with:   [employer]

## 2021-01-22 NOTE — PROGRESS NOTES
Care Management Interventions  PCP Verified by CM: Yes(Deniz Crane)  Mode of Transport at Discharge: Other (see comment)(family)  Transition of Care Consult (CM Consult): Discharge Planning  Discharge Durable Medical Equipment: No  Physical Therapy Consult: Yes  Occupational Therapy Consult: Yes  Speech Therapy Consult: Yes  Current Support Network: Own Home, Other(pt's daughter lives with her)  Confirm Follow Up Transport: Family  The Plan for Transition of Care is Related to the Following Treatment Goals : home  The Patient and/or Patient Representative was Provided with a Choice of Provider and Agrees with the Discharge Plan?: Yes  Name of the Patient Representative Who was Provided with a Choice of Provider and Agrees with the Discharge Plan: Ms. Radha Dumas of Choice List was Provided with Basic Dialogue that Supports the Patient's Individualized Plan of Care/Goals, Treatment Preferences and Shares the Quality Data Associated with the Providers?: Yes   Resource Information Provided?: No  Discharge Location  Discharge Placement: Home      This CM met with pt this day to complete assessment. She verified her PCP, insurance, emergency contact, and home address. She reports no difficulty obtaining her medications in the community. She lives at home with her daughter with 3 flights of stairs to enter and no home DME. She reports that at baseline prior to admission she is independent with her ADLs including bathing, dressing, cooking, and driving. We discussed discharge planning this day. Pt to return home with daughter when stable for discharge. PT, OT, and SLP consulted and they have completed their evaluation with no further recommendations of therapy services at discharge. No additional CM needs at this time. Will continue to follow and update as needed.

## 2021-01-22 NOTE — PROGRESS NOTES
ACUTE PHYSICAL THERAPY GOALS:  (Developed with and agreed upon by patient and/or caregiver.)  No goals set, patient functioning independently. PHYSICAL THERAPY ASSESSMENT: Initial Assessment and Discharge PT Treatment Day Bess Noble is a 52 y.o. female   PRIMARY DIAGNOSIS: <principal problem not specified>  TIA (transient ischemic attack) [G45.9]       Reason for Referral:     ICD-10: Treatment Diagnosis: Other abnormalities of gait and mobility (R26.89)  OBSERVATION: Payor: [facility] Melissa Coburn S / Plan: [facility] [employer] [address] / Product Type: Commerical /     ASSESSMENT:     REHAB RECOMMENDATIONS:   Recommendation to date pending progress:  Setting:   No further skilled therapy   Equipment:    None     PRIOR LEVEL OF FUNCTION:  (Prior to Hospitalization) INITIAL/CURRENT LEVEL OF FUNCTION:  (Most Recently Demonstrated)   Bed Mobility:   Independent  Sit to Stand:   Independent  Transfers:   Independent  Gait/Mobility:   Independent Bed Mobility:   Independent  Sit to Stand:   Independent  Transfers:   Independent  Gait/Mobility:   Independent     ASSESSMENT:  Ms. Baron Donaldson lives with her daughter. She is independent in home and community. Patient appears to be functioning at baseline, discontinue skilled PT.     SUBJECTIVE:   Ms. Baron Donaldson states, \"My back hurts. .\"    SOCIAL HISTORY/LIVING ENVIRONMENT: Ms. Baron Donaldson lives with her daughter. She is independent in home and community.    Home Environment: Apartment  One/Two Story Residence: One story(lives on 3rd floor)  Living Alone: No  Support Systems: Child(cody)  OBJECTIVE:     PAIN: VITAL SIGNS: LINES/DRAINS:   Pre Treatment: Pain Screen  Pain Scale 1: Numeric (0 - 10)  Pain Intensity 1: 10  Pain Onset 1: 2 weeks ago  Pain Location 1: Back  Pain Orientation 1: Lower  Pain Intervention(s) 1: Nurse notified  Post Treatment: 10   monitor  O2 Device: Room air     GROSS EVALUATION:  B LE's Within Functional Limits Abnormal/ Functional Abnormal/ Non-Functional (see comments) Not Tested Comments:   AROM [x] [] [] []    PROM [x] [] [] []    Strength [x] [] [] []    Balance [x] [] [] []    Posture [x] [] [] []    Sensation [x] [] [] []    Coordination [x] [] [] []    Tone [x] [] [] []    Edema [] [] [] []    Activity Tolerance [x] [] [] []     [] [] [] []      COGNITION/  PERCEPTION: Intact Impaired   (see comments) Comments:   Orientation [x] []    Vision [] []    Hearing [x] []    Command Following [x] []    Safety Awareness [x] []     [] []      MOBILITY: I Mod I S SBA CGA Min Mod Max Total  NT x2 Comments:   Bed Mobility    Rolling [x] [] [] [] [] [] [] [] [] [] []    Supine to Sit [x] [] [] [] [] [] [] [] [] [] []    Scooting [x] [] [] [] [] [] [] [] [] [] []    Sit to Supine [] [] [] [] [] [] [] [] [] [] []    Transfers    Sit to Stand [x] [] [] [] [] [] [] [] [] [] []    Bed to Chair [x] [] [] [] [] [] [] [] [] [] []    Stand to Sit [] [] [] [] [] [] [] [] [] [] []    I=Independent, Mod I=Modified Independent, S=Supervision, SBA=Standby Assistance, CGA=Contact Guard Assistance,   Min=Minimal Assistance, Mod=Moderate Assistance, Max=Maximal Assistance, Total=Total Assistance, NT=Not Tested  GAIT: I Mod I S SBA CGA Min Mod Max Total  NT x2 Comments:   Level of Assistance [x] [] [] [] [] [] [] [] [] [] []    Distance 150'    DME None    Gait Quality Slow leny    Weightbearing Status N/A     I=Independent, Mod I=Modified Independent, S=Supervision, SBA=Standby Assistance, CGA=Contact Guard Assistance,   Min=Minimal Assistance, Mod=Moderate Assistance, Max=Maximal Assistance, Total=Total Assistance, NT=Not Tested    [address] [facility] [address] [id number] AM-PAC 6 Clicks   Basic Mobility Inpatient Short Form       How much difficulty does the patient currently have. .. Unable A Lot A Little None   1. Turning over in bed (including adjusting bedclothes, sheets and blankets)? [] 1   [] 2   [] 3   [x] 4   2.   Sitting down on and standing up from a chair with arms ( e.g., wheelchair, bedside commode, etc.)   [] 1   [] 2   [] 3   [x] 4   3. Moving from lying on back to sitting on the side of the bed? [] 1   [] 2   [] 3   [x] 4   How much help from another person does the patient currently need. .. Total A Lot A Little None   4. Moving to and from a bed to a chair (including a wheelchair)? [] 1   [] 2   [] 3   [x] 4   5. Need to walk in hospital room? [] 1   [] 2   [] 3   [x] 4   6. Climbing 3-5 steps with a railing? [] 1   [] 2   [] 3   [x] 4   © 2007, Trustees of [facility] [employer], under license to [employer]. All rights reserved     Score:  Initial: 24 Most Recent: X (Date: -- )    Interpretation of Tool:  Represents activities that are increasingly more difficult (i.e. Bed mobility, Transfers, Gait).     PLAN:   FREQUENCY/DURATION:   for duration of hospital stay or until stated goals are met, whichever comes first.     1.      TREATMENT:     EVALUATION: Low Complexity : (Untimed Charge)    TREATMENT:   (     )  eval only    AFTER TREATMENT POSITION/PRECAUTIONS:  Chair, Needs within reach and RN notified    INTERDISCIPLINARY COLLABORATION:  RN/PCT and PT/PTA    TOTAL TREATMENT DURATION:  PT Patient Time In/Time Out  Time In: 1140  Time Out: [id number] [facility] [address], PT, DPT

## 2021-01-22 NOTE — ED PROVIDER NOTES
Chief complaint : Possible stroke symptoms    HISTORY OF PRESENT ILLNESS :  Location : speech difficulties, and numbness to left lower extremity    Quality : Stuttering    Quantity : Constant    Timing : 30 minutes prior to arrival, roughly 1 AM    Severity : Moderate    Context : 4 or 5 previous spells, patient states one was thought to be a TIA. Alleviating / exacerbating factors :     Associated Symptoms : Pain in leg, and numbness in leg, difficulty walking was due to the pain in her leg/foot             Past Medical History:   Diagnosis Date    Ill-defined condition     hx migraines       Past Surgical History:   Procedure Laterality Date    HX TUBAL LIGATION           No family history on file.     Social History     Socioeconomic History    Marital status: SINGLE     Spouse name: Not on file    Number of children: Not on file    Years of education: Not on file    Highest education level: Not on file   Occupational History    Not on file   Social Needs    Financial resource strain: Not on file    Food insecurity     Worry: Not on file     Inability: Not on file    Transportation needs     Medical: Not on file     Non-medical: Not on file   Tobacco Use    Smoking status: Never Smoker    Smokeless tobacco: Never Used   Substance and Sexual Activity    Alcohol use: No    Drug use: No    Sexual activity: Never     Birth control/protection: Surgical   Lifestyle    Physical activity     Days per week: Not on file     Minutes per session: Not on file    Stress: Not on file   Relationships    Social connections     Talks on phone: Not on file     Gets together: Not on file     Attends Yazidism service: Not on file     Active member of club or organization: Not on file     Attends meetings of clubs or organizations: Not on file     Relationship status: Not on file    Intimate partner violence     Fear of current or ex partner: Not on file     Emotionally abused: Not on file     Physically abused: Not on file     Forced sexual activity: Not on file   Other Topics Concern    Not on file   Social History Narrative    Not on file         ALLERGIES: Patient has no known allergies. Review of Systems   Constitutional: Negative for chills and fever. HENT: Negative for rhinorrhea and sore throat. Eyes: Negative for discharge, redness and visual disturbance. Respiratory: Negative for cough and shortness of breath. Cardiovascular: Positive for palpitations. Negative for chest pain. Gastrointestinal: Negative for abdominal pain, diarrhea, nausea and vomiting. Musculoskeletal: Positive for arthralgias and gait problem. Negative for back pain. Skin: Negative for rash. Neurological: Positive for speech difficulty and numbness. Negative for dizziness, weakness and headaches. All other systems reviewed and are negative. Vitals:    01/22/21 0126   BP: (!) 161/101   Pulse: (!) 151   Resp: 16   Temp: 98.1 °F (36.7 °C)   SpO2: 98%   Weight: 117.9 kg (260 lb)   Height: 5' 11\" (1.803 m)            Physical Exam  Vitals signs and nursing note reviewed. Constitutional:       General: She is not in acute distress. Appearance: Normal appearance. She is well-developed. She is not ill-appearing, toxic-appearing or diaphoretic. HENT:      Head: Normocephalic and atraumatic. Right Ear: External ear normal.      Left Ear: External ear normal.      Mouth/Throat:      Mouth: Mucous membranes are moist.      Pharynx: Oropharynx is clear. No oropharyngeal exudate or posterior oropharyngeal erythema. Eyes:      General: No scleral icterus. Right eye: No discharge. Left eye: No discharge. Extraocular Movements: Extraocular movements intact. Conjunctiva/sclera: Conjunctivae normal.      Pupils: Pupils are equal, round, and reactive to light. Neck:      Musculoskeletal: Normal range of motion and neck supple. Normal range of motion. Thyroid: No thyromegaly.       Trachea: Trachea normal.   Cardiovascular:      Rate and Rhythm: Regular rhythm. Tachycardia present. Heart sounds: Normal heart sounds. No murmur. No gallop. Pulmonary:      Effort: Pulmonary effort is normal. No respiratory distress. Breath sounds: Normal breath sounds. No wheezing or rales. Abdominal:      General: Bowel sounds are normal.      Palpations: Abdomen is soft. There is no hepatomegaly, splenomegaly or pulsatile mass. Tenderness: There is no abdominal tenderness. There is no guarding. Musculoskeletal: Normal range of motion. Lymphadenopathy:      Cervical: No cervical adenopathy. Skin:     General: Skin is warm and dry. Neurological:      General: No focal deficit present. Mental Status: She is alert and oriented to person, place, and time. Mental status is at baseline. Motor: No abnormal muscle tone. Comments: cni 2-12   Impressive stuttering dysarthria   Psychiatric:         Mood and Affect: Mood normal.         Behavior: Behavior normal.          MDM  Number of Diagnoses or Management Options  Stroke-like symptom  Diagnosis management comments: Medical decision making note:  Stuttering dysarthria  Several prior episodes  Two prior negative cva work ups for similar presentations, both with negative mri brain. Only one was with stuttering  Doubtful for cva but tele-neurology recommends mri if only one mri was done for stuttering  Favors conversion d/o  Stuttering has waxed/waned in e.r. This concludes the \"medical decision making note\" part of this emergency department visit note.          Amount and/or Complexity of Data Reviewed  Clinical lab tests: reviewed and ordered  Tests in the radiology section of CPT®: reviewed and ordered  Decide to obtain previous medical records or to obtain history from someone other than the patient: yes (5/2020:Patient presents with    Cerebrovascular Accident     History of Present Illness: 54 y.o. female who presents with right-sided upper extremity fine motor weakness. This started this morning when she attempted to make herself breakfast and she found she couldn't grasp onto things in her kitchen. It was associated with RLE numbness and weakness. It has improved since her arrival to the ED. Her initial NIHSS was 1. She was evaluated by Neurology with recommendations for a TIA evaluation. Of note, the patient also presented last week to the ED with complaints of sudden onset of SOB and tachycardia and at that visit she described 2 weeks of RLE pain. Her evaluation during that visit was unremarkable. Tonight she denies any SOB or tachycardia since then. Hospital Course:     1. Right-sided weakness- Resolved. This is of unclear etiology. Anxiety could be playing a role. Brain MRI, Head/Neck CTAs, and Echo were unremarkable.  Neurology not recommending Aspirin 81 mg from a neurological standpoint but will order for stroke prevention/risk reduction.  )  Obtain history from someone other than the patient: yes (Daughter  )  Review and summarize past medical records: yes (8/29/20  Stuttering  cva vs tia vs conversion  Ct and mri were negative  Felt to be conversion disorder)  Discuss the patient with other providers: yes (Tele-neuro, and hospitaliist)    Risk of Complications, Morbidity, and/or Mortality  Presenting problems: moderate  Diagnostic procedures: low  Management options: moderate    Patient Progress  Patient progress: improved         Procedures

## 2021-01-22 NOTE — DISCHARGE INSTRUCTIONS

## 2021-01-22 NOTE — PROGRESS NOTES
Discharge instructions reviewed with patient. Time for questions allowed. Patient escorted to discharge area via wheelchair where her daughter drove her home.

## 2021-01-22 NOTE — PROGRESS NOTES
ACUTE OT GOALS:  (Developed with and agreed upon by patient and/or caregiver.)  No goals needed. OCCUPATIONAL THERAPY ASSESSMENT: Initial Assessment, Daily Note and Discharge OT Treatment Day #      Krunal Blackburn is a 52 y.o. female   PRIMARY DIAGNOSIS: <principal problem not specified>  TIA (transient ischemic attack) [G45.9]       Reason for Referral:    ICD-10: Treatment Diagnosis: Generalized Muscle Weakness (M62.81)  Other lack of cordination (R27.8)  Low Back Pain (M54.5)  OBSERVATION: Payor: MEDICAL [facility] / Plan: [facility] MEDICAL [place] [facility] / Product Type: Commerical /   ASSESSMENT:     REHAB RECOMMENDATIONS:   Recommendation to date pending progress:  Setting:   No further skilled therapy   Equipment:    None     PRIOR LEVEL OF FUNCTION:  (Prior to Hospitalization)  INITIAL/CURRENT LEVEL OF FUNCTION:  (Based on today's evaluation)   Bathing:   Independent  Dressing:   Independent  Feeding/Grooming:   Independent  Toileting:   Independent  Functional Mobility:   Independent Bathing:   Independent  Dressing:   Independent  Feeding/Grooming:   Independent  Toileting:   Independent  Functional Mobility:   Independent     ASSESSMENT:  Ms. Sara Lemon presents to the hospital with reported difficulty getting her words out. Pt has a reported hx of TIAs int he past. Pt reports 10/10 back pain that started 2 weeks ago along the L SI joint. Pt moving well today but maybe slightly slower due to back pain. Pt's strength and sensation are intact but she feels that her R side is slightly slower with finger to nose test. Pt completed observed functional transfers, mobility, and ADL independent. Pt has no further OT needs at this time. SUBJECTIVE:   Ms. Sara Lemon states, \"I've had about 5 TIAs. \"    SOCIAL HISTORY/LIVING ENVIRONMENT: Pt lives with her daughter. Pt is typically independent and working as a .    Home Environment: Apartment  One/Two Story Residence: One story(lives on 3rd floor)  Living Alone: No  Support Systems: Child(cody)    OBJECTIVE:     PAIN: VITAL SIGNS: LINES/DRAINS:   Pre Treatment: Pain Screen  Pain Scale 1: Numeric (0 - 10)  Pain Intensity 1: 10  Pain Location 1: Back  Pain Orientation 1: Lower; Left  Pain Intervention(s) 1: Repositioned  Post Treatment: 10   None  O2 Device: Room air     GROSS EVALUATION:   B UE Within Functional Limits Abnormal/ Functional Abnormal/ Non-Functional (see comments) Not Tested Comments:   AROM [x] [] [] []    PROM [x] [] [] []    Strength [x] [] [] []    Balance [x] [] [] []    Posture [x] [] [] []    Sensation [x] [] [] []    Coordination [] [x] [] [] Slightly slower on the R side   Tone [x] [] [] []    Edema [x] [] [] []    Activity Tolerance [] [] [] []     [] [x] [] []      COGNITION/  PERCEPTION: Intact Impaired   (see comments) Comments:   Orientation [x] []    Vision [x] []    Hearing [x] []    Judgment/ Insight [x] []    Attention [x] []    Memory [x] []    Command Following [x] []    Emotional Regulation [x] []     [] []      ACTIVITIES OF DAILY LIVING: I Mod I S SBA CGA Min Mod Max Total NT Comments   BASIC ADLs:              Bathing/ Showering [] [] [] [] [] [] [] [] [] [x]    Toileting [x] [] [] [] [] [] [] [] [] []    Dressing [x] [] [] [] [] [] [] [] [] []    Feeding [] [] [] [] [] [] [] [] [] [x]    Grooming [x] [] [] [] [] [] [] [] [] []    Personal Device Care [] [] [] [] [] [] [] [] [] [x]    Functional Mobility [x] [] [] [] [] [] [] [] [] []    I=Independent, Mod I=Modified Independent, S=Supervision, SBA=Standby Assistance, CGA=Contact Guard Assistance,   Min=Minimal Assistance, Mod=Moderate Assistance, Max=Maximal Assistance, Total=Total Assistance, NT=Not Tested    MOBILITY: I Mod I S SBA CGA Min Mod Max Total  NT x2 Comments:   Supine to sit [x] [] [] [] [] [] [] [] [] [] []    Sit to supine [x] [] [] [] [] [] [] [] [] [] []    Sit to stand [x] [] [] [] [] [] [] [] [] [] []    Bed to chair [x] [] [] [] [] [] [] [] [] [] []    I=Independent, Mod I=Modified Independent, S=Supervision, SBA=Standby Assistance, CGA=Contact Guard Assistance,   Min=Minimal Assistance, Mod=Moderate Assistance, Max=Maximal Assistance, Total=Total Assistance, NT=Not [facility] [address]   Daily Activity Inpatient Short Form        How much help from another person does the patient currently need. .. Total A Lot A Little None   1. Putting on and taking off regular lower body clothing? [] 1   [] 2   [] 3   [x] 4   2. Bathing (including washing, rinsing, drying)? [] 1   [] 2   [] 3   [x] 4   3. Toileting, which includes using toilet, bedpan or urinal?   [] 1   [] 2   [] 3   [x] 4   4. Putting on and taking off regular upper body clothing? [] 1   [] 2   [] 3   [x] 4   5. Taking care of personal grooming such as brushing teeth? [] 1   [] 2   [] 3   [x] 4   6. Eating meals? [] 1   [] 2   [] 3   [x] 4   © 2007, Trustees of [address] [place] [address], under license to [employer]. All rights reserved     Score:  Initial:  24 Most Recent: X (Date: -- )   Interpretation of Tool:  Represents activities that are increasingly more difficult (i.e. Bed mobility, Transfers, Gait).     PLAN:   FREQUENCY/DURATION: OT Plan of Care: (Discharge) for duration of hospital stay or until stated goals are met, whichever comes first.    PROBLEM LIST:   (Skilled intervention is medically necessary to address:)  1. n/a   INTERVENTIONS PLANNED:   (Benefits and precautions of occupational therapy have been discussed with the patient.)  1. n/a     TREATMENT:     EVALUATION: Low Complexity : (Untimed Charge)    TREATMENT:   (     )  Evaluation only    AFTER TREATMENT POSITION/PRECAUTIONS:  Chair, Needs within reach and RN notified    INTERDISCIPLINARY COLLABORATION:  RN/PCT and OT/PADILLA    TOTAL TREATMENT DURATION:  OT Patient Time In/Time Out  Time In: 1331  Time Out: CEZAR Harrington

## 2021-02-09 PROBLEM — M19.90 OA (OSTEOARTHRITIS): Status: ACTIVE | Noted: 2020-04-28

## 2021-02-09 PROBLEM — E55.9 VITAMIN D DEFICIENCY: Status: ACTIVE | Noted: 2021-02-09

## 2021-02-09 PROBLEM — G43.909 MIGRAINES: Status: ACTIVE | Noted: 2021-01-22

## 2021-02-09 PROBLEM — E78.5 HLD (HYPERLIPIDEMIA): Status: ACTIVE | Noted: 2020-05-08

## 2021-02-09 PROBLEM — E66.01 SEVERE OBESITY (HCC): Chronic | Status: RESOLVED | Noted: 2019-05-10 | Resolved: 2021-02-09

## 2021-02-09 PROBLEM — D50.9 IDA (IRON DEFICIENCY ANEMIA): Status: ACTIVE | Noted: 2020-05-19

## 2021-02-09 PROBLEM — D25.9 UTERINE LEIOMYOMA: Status: ACTIVE | Noted: 2020-05-26

## 2021-02-09 PROBLEM — I87.2 VENOUS INSUFFICIENCY (CHRONIC) (PERIPHERAL): Status: ACTIVE | Noted: 2021-02-09

## 2021-02-09 PROBLEM — K21.9 GERD (GASTROESOPHAGEAL REFLUX DISEASE): Status: ACTIVE | Noted: 2020-04-28

## 2021-02-09 PROBLEM — F41.0 GENERALIZED ANXIETY DISORDER WITH PANIC ATTACKS: Status: ACTIVE | Noted: 2021-02-09

## 2021-02-09 PROBLEM — G47.33 OSA (OBSTRUCTIVE SLEEP APNEA): Status: ACTIVE | Noted: 2020-04-28

## 2021-02-09 PROBLEM — F41.1 GENERALIZED ANXIETY DISORDER WITH PANIC ATTACKS: Status: ACTIVE | Noted: 2021-02-09

## 2021-02-24 ENCOUNTER — APPOINTMENT (OUTPATIENT)
Dept: CT IMAGING | Age: 51
End: 2021-02-24
Attending: EMERGENCY MEDICINE
Payer: COMMERCIAL

## 2021-02-24 ENCOUNTER — HOSPITAL ENCOUNTER (EMERGENCY)
Age: 51
Discharge: HOME | End: 2021-02-24
Attending: EMERGENCY MEDICINE
Payer: COMMERCIAL

## 2021-02-24 VITALS
BODY MASS INDEX: 35 KG/M2 | HEART RATE: 100 BPM | DIASTOLIC BLOOD PRESSURE: 88 MMHG | RESPIRATION RATE: 16 BRPM | WEIGHT: 250 LBS | SYSTOLIC BLOOD PRESSURE: 151 MMHG | HEIGHT: 71 IN | OXYGEN SATURATION: 100 % | TEMPERATURE: 97.8 F

## 2021-02-24 DIAGNOSIS — R55 NEAR SYNCOPE: ICD-10-CM

## 2021-02-24 DIAGNOSIS — R51.9 NONINTRACTABLE HEADACHE, UNSPECIFIED CHRONICITY PATTERN, UNSPECIFIED HEADACHE TYPE: Primary | ICD-10-CM

## 2021-02-24 LAB
ALBUMIN SERPL-MCNC: 3.5 G/DL (ref 3.5–5)
ALBUMIN/GLOB SERPL: 0.8 {RATIO} (ref 1.2–3.5)
ALP SERPL-CCNC: 76 U/L (ref 50–136)
ALT SERPL-CCNC: 17 U/L (ref 12–65)
ANION GAP SERPL CALC-SCNC: 5 MMOL/L (ref 7–16)
AST SERPL-CCNC: 9 U/L (ref 15–37)
BASOPHILS # BLD: 0 K/UL (ref 0–0.2)
BASOPHILS NFR BLD: 0 % (ref 0–2)
BILIRUB SERPL-MCNC: 0.3 MG/DL (ref 0.2–1.1)
BUN SERPL-MCNC: 7 MG/DL (ref 6–23)
CALCIUM SERPL-MCNC: 9.4 MG/DL (ref 8.3–10.4)
CHLORIDE SERPL-SCNC: 107 MMOL/L (ref 98–107)
CO2 SERPL-SCNC: 26 MMOL/L (ref 21–32)
CREAT SERPL-MCNC: 0.87 MG/DL (ref 0.6–1)
DIFFERENTIAL METHOD BLD: ABNORMAL
EOSINOPHIL # BLD: 0.2 K/UL (ref 0–0.8)
EOSINOPHIL NFR BLD: 3 % (ref 0.5–7.8)
ERYTHROCYTE [DISTWIDTH] IN BLOOD BY AUTOMATED COUNT: 15 % (ref 11.9–14.6)
GLOBULIN SER CALC-MCNC: 4.6 G/DL (ref 2.3–3.5)
GLUCOSE BLD STRIP.AUTO-MCNC: 131 MG/DL (ref 65–100)
GLUCOSE SERPL-MCNC: 98 MG/DL (ref 65–100)
HCG UR QL: NEGATIVE
HCT VFR BLD AUTO: 41.6 % (ref 35.8–46.3)
HGB BLD-MCNC: 12.6 G/DL (ref 11.7–15.4)
IMM GRANULOCYTES # BLD AUTO: 0 K/UL (ref 0–0.5)
IMM GRANULOCYTES NFR BLD AUTO: 0 % (ref 0–5)
LYMPHOCYTES # BLD: 2.1 K/UL (ref 0.5–4.6)
LYMPHOCYTES NFR BLD: 28 % (ref 13–44)
MAGNESIUM SERPL-MCNC: 2.3 MG/DL (ref 1.8–2.4)
MCH RBC QN AUTO: 24.4 PG (ref 26.1–32.9)
MCHC RBC AUTO-ENTMCNC: 30.3 G/DL (ref 31.4–35)
MCV RBC AUTO: 80.5 FL (ref 79.6–97.8)
MONOCYTES # BLD: 0.3 K/UL (ref 0.1–1.3)
MONOCYTES NFR BLD: 4 % (ref 4–12)
NEUTS SEG # BLD: 4.7 K/UL (ref 1.7–8.2)
NEUTS SEG NFR BLD: 64 % (ref 43–78)
NRBC # BLD: 0 K/UL (ref 0–0.2)
PLATELET # BLD AUTO: 359 K/UL (ref 150–450)
PMV BLD AUTO: 9.2 FL (ref 9.4–12.3)
POTASSIUM SERPL-SCNC: 3.9 MMOL/L (ref 3.5–5.1)
PROT SERPL-MCNC: 8.1 G/DL (ref 6.3–8.2)
RBC # BLD AUTO: 5.17 M/UL (ref 4.05–5.2)
SODIUM SERPL-SCNC: 138 MMOL/L (ref 136–145)
TROPONIN-HIGH SENSITIVITY: 4.8 PG/ML (ref 0–14)
WBC # BLD AUTO: 7.3 K/UL (ref 4.3–11.1)

## 2021-02-24 PROCEDURE — 85025 COMPLETE CBC W/AUTO DIFF WBC: CPT

## 2021-02-24 PROCEDURE — 96375 TX/PRO/DX INJ NEW DRUG ADDON: CPT

## 2021-02-24 PROCEDURE — 84484 ASSAY OF TROPONIN QUANT: CPT

## 2021-02-24 PROCEDURE — 74011250636 HC RX REV CODE- 250/636: Performed by: EMERGENCY MEDICINE

## 2021-02-24 PROCEDURE — 99285 EMERGENCY DEPT VISIT HI MDM: CPT

## 2021-02-24 PROCEDURE — 96374 THER/PROPH/DIAG INJ IV PUSH: CPT

## 2021-02-24 PROCEDURE — 82962 GLUCOSE BLOOD TEST: CPT

## 2021-02-24 PROCEDURE — 80053 COMPREHEN METABOLIC PANEL: CPT

## 2021-02-24 PROCEDURE — 83735 ASSAY OF MAGNESIUM: CPT

## 2021-02-24 PROCEDURE — 96361 HYDRATE IV INFUSION ADD-ON: CPT

## 2021-02-24 PROCEDURE — 70450 CT HEAD/BRAIN W/O DYE: CPT

## 2021-02-24 PROCEDURE — 81025 URINE PREGNANCY TEST: CPT

## 2021-02-24 PROCEDURE — 93005 ELECTROCARDIOGRAM TRACING: CPT | Performed by: EMERGENCY MEDICINE

## 2021-02-24 RX ORDER — ONDANSETRON 2 MG/ML
4 INJECTION INTRAMUSCULAR; INTRAVENOUS
Status: COMPLETED | OUTPATIENT
Start: 2021-02-24 | End: 2021-02-24

## 2021-02-24 RX ORDER — KETOROLAC TROMETHAMINE 30 MG/ML
30 INJECTION, SOLUTION INTRAMUSCULAR; INTRAVENOUS
Status: COMPLETED | OUTPATIENT
Start: 2021-02-24 | End: 2021-02-24

## 2021-02-24 RX ADMIN — ONDANSETRON 4 MG: 2 INJECTION INTRAMUSCULAR; INTRAVENOUS at 22:54

## 2021-02-24 RX ADMIN — SODIUM CHLORIDE 1000 ML: 900 INJECTION, SOLUTION INTRAVENOUS at 21:05

## 2021-02-24 RX ADMIN — KETOROLAC TROMETHAMINE 30 MG: 30 INJECTION, SOLUTION INTRAMUSCULAR at 22:54

## 2021-02-25 ENCOUNTER — PATIENT OUTREACH (OUTPATIENT)
Dept: OTHER | Age: 51
End: 2021-02-25

## 2021-02-25 LAB
ATRIAL RATE: 96 BPM
CALCULATED P AXIS, ECG09: 56 DEGREES
CALCULATED R AXIS, ECG10: 41 DEGREES
CALCULATED T AXIS, ECG11: 38 DEGREES
DIAGNOSIS, 93000: NORMAL
P-R INTERVAL, ECG05: 174 MS
Q-T INTERVAL, ECG07: 332 MS
QRS DURATION, ECG06: 70 MS
QTC CALCULATION (BEZET), ECG08: 419 MS
VENTRICULAR RATE, ECG03: 96 BPM

## 2021-02-25 NOTE — DISCHARGE INSTRUCTIONS
Continue taking home medications as prescribed. Schedule close follow-up with primary care addition, neurology. Return to ED if symptoms worsen or progress in any way.

## 2021-02-25 NOTE — ED PROVIDER NOTES
46-year-old female with history of TIA, conversion disorder, migraine presents via wheelchair from 8 floor with complaint of mild lightheadedness and frontal headache and \"feeling weird\" for short period of time. States symptoms have completely resolved. Denies vision disturbance, nausea, vomiting, chest pain, shortness of breath, abdominal pain, difficulty walking, focal weakness, facial droop, slurred speech, numbness, tingling, weakness. Patient describes headache as dull, nonradiating. The history is provided by the patient. No  was used. Headache   This is a new problem. The current episode started 1 to 2 hours ago. The problem occurs constantly. The problem has been resolved. The headache is aggravated by nothing. The pain is located in the frontal and bilateral region. The quality of the pain is described as dull. The pain is at a severity of 3/10. The pain is mild. Associated symptoms include malaise/fatigue. Pertinent negatives include no anorexia, no fever, no chest pressure, no near-syncope, no orthopnea, no palpitations, no syncope, no shortness of breath, no weakness, no tingling, no dizziness, no visual change, no nausea and no vomiting. She has tried nothing for the symptoms. The treatment provided no relief. Past Medical History:   Diagnosis Date    GERD (gastroesophageal reflux disease) 4/28/2020    Ill-defined condition     hx migraines    Migraine without aura 1/22/2021    OA (osteoarthritis) 4/28/2020    SHAISTA (obstructive sleep apnea) 4/28/2020    TIA (transient ischemic attack) 1/22/2021       Past Surgical History:   Procedure Laterality Date    HX TUBAL LIGATION           No family history on file.     Social History     Socioeconomic History    Marital status: SINGLE     Spouse name: Not on file    Number of children: Not on file    Years of education: Not on file    Highest education level: Not on file   Occupational History    Not on file Social Needs    Financial resource strain: Not on file    Food insecurity     Worry: Not on file     Inability: Not on file    Transportation needs     Medical: Not on file     Non-medical: Not on file   Tobacco Use    Smoking status: Never Smoker    Smokeless tobacco: Never Used   Substance and Sexual Activity    Alcohol use: Yes     Frequency: Monthly or less     Drinks per session: 1 or 2     Binge frequency: Less than monthly     Comment: occassionally    Drug use: No    Sexual activity: Never     Birth control/protection: Surgical   Lifestyle    Physical activity     Days per week: Not on file     Minutes per session: Not on file    Stress: Not on file   Relationships    Social connections     Talks on phone: Not on file     Gets together: Not on file     Attends Mandaen service: Not on file     Active member of club or organization: Not on file     Attends meetings of clubs or organizations: Not on file     Relationship status: Not on file    Intimate partner violence     Fear of current or ex partner: Not on file     Emotionally abused: Not on file     Physically abused: Not on file     Forced sexual activity: Not on file   Other Topics Concern    Not on file   Social History Narrative    Not on file         ALLERGIES: Patient has no known allergies. Review of Systems   Constitutional: Positive for malaise/fatigue. Negative for chills, diaphoresis and fever. HENT: Negative for congestion and rhinorrhea. Eyes: Negative for photophobia and visual disturbance. Respiratory: Negative for cough and shortness of breath. Cardiovascular: Negative for palpitations, orthopnea, syncope and near-syncope. Gastrointestinal: Negative for abdominal pain, anorexia, nausea and vomiting. Genitourinary: Negative for dysuria and flank pain. Musculoskeletal: Negative for arthralgias, gait problem, myalgias, neck pain and neck stiffness. Skin: Negative for rash.    Neurological: Positive for headaches. Negative for dizziness, tingling, tremors, seizures, syncope, facial asymmetry, speech difficulty, weakness and numbness. Psychiatric/Behavioral: Negative for confusion. Vitals:    02/24/21 1954   BP: (!) 153/93   Pulse: 100   Resp: 16   Temp: 97.8 °F (36.6 °C)   SpO2: 99%   Weight: 113.4 kg (250 lb)   Height: 5' 11\" (1.803 m)            Physical Exam  Vitals signs and nursing note reviewed. Constitutional:       Appearance: Normal appearance. Comments: Well-appearing. Patient in no acute distress. HENT:      Head: Normocephalic and atraumatic. Comments: No facial droop noted. Nose: Nose normal.      Mouth/Throat:      Mouth: Mucous membranes are moist.   Eyes:      Extraocular Movements: Extraocular movements intact. Pupils: Pupils are equal, round, and reactive to light. Comments: No nystagmus. Neck:      Musculoskeletal: Normal range of motion. No neck rigidity. Cardiovascular:      Rate and Rhythm: Normal rate. Pulses: Normal pulses. Heart sounds: Normal heart sounds. Pulmonary:      Effort: Pulmonary effort is normal.      Breath sounds: Normal breath sounds. Comments: CTAB. Abdominal:      General: Bowel sounds are normal.      Palpations: Abdomen is soft. Tenderness: There is no abdominal tenderness. There is no guarding or rebound. Musculoskeletal: Normal range of motion. Skin:     Findings: No erythema or rash. Neurological:      General: No focal deficit present. Mental Status: She is alert and oriented to person, place, and time. Cranial Nerves: No cranial nerve deficit. Sensory: No sensory deficit. Motor: No weakness. Gait: Gait normal.      Comments: No facial droop. No dysarthria. Strength 5 out of 5 throughout. No drift. Normal sensory exam.  Normal gait.           MDM  Number of Diagnoses or Management Options  Near syncope: new and requires workup  Nonintractable headache, unspecified chronicity pattern, unspecified headache type: new and requires workup  Diagnosis management comments: Vital signs stable. Patient complains of mild frontal headache. Patient with history of migraines. Normal neuro exam.  No facial droop. No dysarthria. Strength 5 out of 5 throughout. No drift. No indication for Code S to be called. Patient with no strokelike symptoms at this time. CT head negative. Labs unremarkable. EKG with normal sinus rhythm. No ischemic changes noted. Urine pregnancy test negative. Patient given IV fluid hydration, Toradol 30 mg IV, Zofran 4 mg IV. Amount and/or Complexity of Data Reviewed  Clinical lab tests: ordered and reviewed  Tests in the radiology section of CPT®: ordered and reviewed  Tests in the medicine section of CPT®: ordered and reviewed  Review and summarize past medical records: yes  Independent visualization of images, tracings, or specimens: yes    Risk of Complications, Morbidity, and/or Mortality  Presenting problems: moderate  Diagnostic procedures: low  Management options: moderate    Patient Progress  Patient progress: stable    ED Course as of Feb 24 2246 Wed Feb 24, 2021 2245 CT head IMPRESSION  1. No CT evidence of acute intracranial process. [DF]      ED Course User Index  [DF] Talon Kirk MD       EKG    Date/Time: 2/24/2021 10:50 PM  Performed by: Talon Kirk MD  Authorized by:  Talon Kirk MD     ECG reviewed by ED Physician in the absence of a cardiologist: yes    Rate:     ECG rate:  96    ECG rate assessment: normal    Rhythm:     Rhythm: sinus rhythm    Ectopy:     Ectopy: none    QRS:     QRS axis:  Normal    QRS intervals:  Normal  Conduction:     Conduction: normal    ST segments:     ST segments:  Normal  T waves:     T waves: normal            Results Include:    Recent Results (from the past 24 hour(s))   EKG, 12 LEAD, INITIAL    Collection Time: 02/24/21  7:55 PM   Result Value Ref Range    Ventricular Rate 96 BPM    Atrial Rate 96 BPM    P-R Interval 174 ms    QRS Duration 70 ms    Q-T Interval 332 ms    QTC Calculation (Bezet) 419 ms    Calculated P Axis 56 degrees    Calculated R Axis 41 degrees    Calculated T Axis 38 degrees    Diagnosis       Normal sinus rhythm  Normal ECG  When compared with ECG of 22-JAN-2021 01:53,  No significant change was found     GLUCOSE, POC    Collection Time: 02/24/21  8:06 PM   Result Value Ref Range    Glucose (POC) 131 (H) 65 - 100 mg/dL   CBC WITH AUTOMATED DIFF    Collection Time: 02/24/21  8:39 PM   Result Value Ref Range    WBC 7.3 4.3 - 11.1 K/uL    RBC 5.17 4.05 - 5.2 M/uL    HGB 12.6 11.7 - 15.4 g/dL    HCT 41.6 35.8 - 46.3 %    MCV 80.5 79.6 - 97.8 FL    MCH 24.4 (L) 26.1 - 32.9 PG    MCHC 30.3 (L) 31.4 - 35.0 g/dL    RDW 15.0 (H) 11.9 - 14.6 %    PLATELET 162 453 - 653 K/uL    MPV 9.2 (L) 9.4 - 12.3 FL    ABSOLUTE NRBC 0.00 0.0 - 0.2 K/uL    DF AUTOMATED      NEUTROPHILS 64 43 - 78 %    LYMPHOCYTES 28 13 - 44 %    MONOCYTES 4 4.0 - 12.0 %    EOSINOPHILS 3 0.5 - 7.8 %    BASOPHILS 0 0.0 - 2.0 %    IMMATURE GRANULOCYTES 0 0.0 - 5.0 %    ABS. NEUTROPHILS 4.7 1.7 - 8.2 K/UL    ABS. LYMPHOCYTES 2.1 0.5 - 4.6 K/UL    ABS. MONOCYTES 0.3 0.1 - 1.3 K/UL    ABS. EOSINOPHILS 0.2 0.0 - 0.8 K/UL    ABS. BASOPHILS 0.0 0.0 - 0.2 K/UL    ABS. IMM. GRANS. 0.0 0.0 - 0.5 K/UL   METABOLIC PANEL, COMPREHENSIVE    Collection Time: 02/24/21  8:39 PM   Result Value Ref Range    Sodium 138 136 - 145 mmol/L    Potassium 3.9 3.5 - 5.1 mmol/L    Chloride 107 98 - 107 mmol/L    CO2 26 21 - 32 mmol/L    Anion gap 5 (L) 7 - 16 mmol/L    Glucose 98 65 - 100 mg/dL    BUN 7 6 - 23 MG/DL    Creatinine 0.87 0.6 - 1.0 MG/DL    GFR est AA >60 >60 ml/min/1.73m2    GFR est non-AA >60 >60 ml/min/1.73m2    Calcium 9.4 8.3 - 10.4 MG/DL    Bilirubin, total 0.3 0.2 - 1.1 MG/DL    ALT (SGPT) 17 12 - 65 U/L    AST (SGOT) 9 (L) 15 - 37 U/L    Alk.  phosphatase 76 50 - 136 U/L    Protein, total 8.1 6.3 - 8.2 g/dL    Albumin 3.5 3.5 - 5.0 g/dL    Globulin 4.6 (H) 2.3 - 3.5 g/dL    A-G Ratio 0.8 (L) 1.2 - 3.5     MAGNESIUM    Collection Time: 02/24/21  8:39 PM   Result Value Ref Range    Magnesium 2.3 1.8 - 2.4 mg/dL   TROPONIN-HIGH SENSITIVITY    Collection Time: 02/24/21  8:39 PM   Result Value Ref Range    Troponin-High Sensitivity 4.8 0 - 14 pg/mL   HCG URINE, QL. - POC    Collection Time: 02/24/21  9:36 PM   Result Value Ref Range    Pregnancy test,urine (POC) Negative NEG           Danilo Rodas MD; 2/24/2021 @8:23 PM Voice dictation software was used during the making of this note. This software is not perfect and grammatical and other typographical errors may be present.   This note has not been proofread for errors.  ===================================================================

## 2021-02-25 NOTE — ED TRIAGE NOTES
Arrives with face mask in place. 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 Sudden onset lightheadedness just pta. States began feeling \"weird\". Denies chest pain, shortness of breath, head pain, vision changes, numbness/tingling, difficulty walking/speaking, n/v/d, urinary symptoms. Denies hx DM. Reports staying well hydrated throughout day today. Denies hx of same.  A/O X4 on arrival

## 2021-02-25 NOTE — ED NOTES
I have reviewed discharge instructions with the patient. The patient verbalized understanding. Patient left ED via Discharge Method: ambulatory to Home with self. Opportunity for questions and clarification provided. Patient given 0 scripts. To continue your aftercare when you leave the hospital, you may receive an automated call from our care team to check in on how you are doing. This is a free service and part of our promise to provide the best care and service to meet your aftercare needs.  If you have questions, or wish to unsubscribe from this service please call [phone]. Thank you for Choosing our [facility] [employer] [facility] Emergency Department.

## 2021-02-25 NOTE — PROGRESS NOTES
Patient on report as eligible for Case Management. Left discreet message on voicemail with this CM contact information. Will attempt to contact again to offer [phone] Management services. Patient is 51 yo female seen in at [facility] on 2/24/21 for complaints of headache and lightheadedness. See ER note below:  51-year-old female with history of TIA, conversion disorder, migraine presents via wheelchair from 8 floor with complaint of mild lightheadedness and frontal headache and \"feeling weird\" for short period of time. States symptoms have completely resolved. Denies vision disturbance, nausea, vomiting, chest pain, shortness of breath, abdominal pain, difficulty walking, focal weakness, facial droop, slurred speech, numbness, tingling, weakness. Patient describes headache as dull, nonradiating.     Headache   This is a new problem. The current episode started 1 to 2 hours ago. The problem occurs constantly. The problem has been resolved. The headache is aggravated by nothing. The pain is located in the frontal and bilateral region. The quality of the pain is described as dull. The pain is at a severity of 3/10. The pain is mild. Associated symptoms include malaise/fatigue. Pertinent negatives include no anorexia, no fever, no chest pressure, no near-syncope, no orthopnea, no palpitations, no syncope, no shortness of breath, no weakness, no tingling, no dizziness, no visual change, no nausea and no vomiting. She has tried nothing for the symptoms. The treatment provided no relief. Near syncope: new and requires workup  Nonintractable headache, unspecified chronicity pattern, unspecified headache type: new and requires workup  Diagnosis management comments: Vital signs stable. Patient complains of mild frontal headache. Patient with history of migraines. Normal neuro exam.  No facial droop. No dysarthria. Strength 5 out of 5 throughout. No drift.   No indication for Code S to be called.  Patient with no strokelike symptoms at this time.  CT head negative.  Labs unremarkable.  EKG with normal sinus rhythm.  No ischemic changes noted.  Urine pregnancy test negative.  Patient given IV fluid hydration, Toradol 30 mg IV, Zofran 4 mg IV    Discharge Instructions    Continue taking home medications as prescribed.    Schedule close follow-up with primary care addition, neurology.    Return to ED if symptoms worsen or progress in any way.

## 2021-02-26 ENCOUNTER — PATIENT OUTREACH (OUTPATIENT)
Dept: OTHER | Age: 51
End: 2021-02-26

## 2021-02-26 NOTE — LETTER
Ms. Javi Mayer [address] [place] [id number] Dear Carlene Darnell, My name is Blas Vigil LPN, Associate Care Manager for [facility] and I have been trying to reach you, for follow up after a recent ER visit. The Associate Care Management (ACM) program is a free-of-charge confidential service provided to our associates and their family members covered by the [facility]. The program will provide an associate and his/her family with the [facility] expertise to assist in navigating the health care delivery system, provider services, and their overall care needsso as to assure and improve health care interactions and enhance the quality of life. This program is designed to provide you with the opportunity to have a [facility] partner with you for the following services: 
 
 1) when you come home from the hospital or emergency room 2) when help is needed to manage your disease 3) when you need assistance coordinating services or appointments 4) when you need additional education, resources or assistance reaching your Be Well Health Program goals/requirements such as Be Well With Diabetes [facility] is dedicated to empowering the good health of its community and improving the quality of care and care experiences for associates and their families. We are committed to safeguarding patient confidentiality and privacy, assuring that every associate has the respect he or she deserves in managing their health. The information shared with your care manager will not be shared with anyone else aside from those you identify as part of your care team, and will only be used to assist you with any identified care needs. Please contact me if you would like this service provided to you. Sincerely, Blas Vigil LPN  [facility] Care Coordinator [facility] [address] [phone] F [phone]  [email or web address]

## 2021-02-26 NOTE — PROGRESS NOTES
Patient identified as eligible for [address] [place] [address] services. Second telephone outreach attempted. Left discreet voicemail with this CM confidential contact information. Will send UTR letter.

## 2021-03-16 ENCOUNTER — TRANSCRIBE ORDER (OUTPATIENT)
Dept: SCHEDULING | Age: 51
End: 2021-03-16

## 2021-03-16 DIAGNOSIS — Z12.31 SCREENING MAMMOGRAM FOR HIGH-RISK PATIENT: Primary | ICD-10-CM

## 2021-03-17 ENCOUNTER — HOSPITAL ENCOUNTER (OUTPATIENT)
Dept: MAMMOGRAPHY | Age: 51
Discharge: HOME | End: 2021-03-17
Attending: FAMILY MEDICINE
Payer: COMMERCIAL

## 2021-03-17 DIAGNOSIS — Z12.31 SCREENING MAMMOGRAM FOR HIGH-RISK PATIENT: ICD-10-CM

## 2021-03-17 PROCEDURE — 77067 SCR MAMMO BI INCL CAD: CPT

## 2021-03-29 ENCOUNTER — PATIENT OUTREACH (OUTPATIENT)
Dept: OTHER | Age: 51
End: 2021-03-29

## 2021-03-29 NOTE — PROGRESS NOTES
Resolving current episode for case management due to patient unable to reach. Patient has not been reached after several calls and UTR letter. Letter previously sent to patient notifying completion of services due to unable to reach. This writer's contact information and information regarding program services included in materials sent.

## 2021-08-17 ENCOUNTER — APPOINTMENT (OUTPATIENT)
Dept: ULTRASOUND IMAGING | Age: 51
End: 2021-08-17
Attending: EMERGENCY MEDICINE

## 2021-08-17 ENCOUNTER — HOSPITAL ENCOUNTER (EMERGENCY)
Age: 51
Discharge: HOME | End: 2021-08-17
Attending: EMERGENCY MEDICINE

## 2021-08-17 VITALS
DIASTOLIC BLOOD PRESSURE: 89 MMHG | SYSTOLIC BLOOD PRESSURE: 155 MMHG | HEIGHT: 71 IN | WEIGHT: 260 LBS | OXYGEN SATURATION: 98 % | HEART RATE: 88 BPM | RESPIRATION RATE: 16 BRPM | BODY MASS INDEX: 36.4 KG/M2 | TEMPERATURE: 98.2 F

## 2021-08-17 DIAGNOSIS — M25.562 ARTHRALGIA OF LEFT LOWER LEG: Primary | ICD-10-CM

## 2021-08-17 LAB
ALBUMIN SERPL-MCNC: 3.3 G/DL (ref 3.5–5)
ALBUMIN/GLOB SERPL: 0.8 {RATIO} (ref 1.2–3.5)
ALP SERPL-CCNC: 67 U/L (ref 50–136)
ALT SERPL-CCNC: 22 U/L (ref 12–65)
ANION GAP SERPL CALC-SCNC: 7 MMOL/L (ref 7–16)
AST SERPL-CCNC: 16 U/L (ref 15–37)
BASOPHILS # BLD: 0 K/UL (ref 0–0.2)
BASOPHILS NFR BLD: 0 % (ref 0–2)
BILIRUB SERPL-MCNC: 0.3 MG/DL (ref 0.2–1.1)
BNP SERPL-MCNC: 31 PG/ML (ref 5–125)
BUN SERPL-MCNC: 7 MG/DL (ref 6–23)
CALCIUM SERPL-MCNC: 9.4 MG/DL (ref 8.3–10.4)
CHLORIDE SERPL-SCNC: 108 MMOL/L (ref 98–107)
CO2 SERPL-SCNC: 24 MMOL/L (ref 21–32)
CREAT SERPL-MCNC: 0.84 MG/DL (ref 0.6–1)
DIFFERENTIAL METHOD BLD: ABNORMAL
EOSINOPHIL # BLD: 0.3 K/UL (ref 0–0.8)
EOSINOPHIL NFR BLD: 3 % (ref 0.5–7.8)
ERYTHROCYTE [DISTWIDTH] IN BLOOD BY AUTOMATED COUNT: 15.9 % (ref 11.9–14.6)
GLOBULIN SER CALC-MCNC: 4.4 G/DL (ref 2.3–3.5)
GLUCOSE SERPL-MCNC: 103 MG/DL (ref 65–100)
HCT VFR BLD AUTO: 39.2 % (ref 35.8–46.3)
HGB BLD-MCNC: 12.1 G/DL (ref 11.7–15.4)
IMM GRANULOCYTES # BLD AUTO: 0 K/UL (ref 0–0.5)
IMM GRANULOCYTES NFR BLD AUTO: 0 % (ref 0–5)
LYMPHOCYTES # BLD: 1.9 K/UL (ref 0.5–4.6)
LYMPHOCYTES NFR BLD: 25 % (ref 13–44)
MCH RBC QN AUTO: 23.9 PG (ref 26.1–32.9)
MCHC RBC AUTO-ENTMCNC: 30.9 G/DL (ref 31.4–35)
MCV RBC AUTO: 77.3 FL (ref 79.6–97.8)
MONOCYTES # BLD: 0.4 K/UL (ref 0.1–1.3)
MONOCYTES NFR BLD: 6 % (ref 4–12)
NEUTS SEG # BLD: 5.1 K/UL (ref 1.7–8.2)
NEUTS SEG NFR BLD: 66 % (ref 43–78)
NRBC # BLD: 0 K/UL (ref 0–0.2)
PLATELET # BLD AUTO: 332 K/UL (ref 150–450)
PMV BLD AUTO: 9.4 FL (ref 9.4–12.3)
POTASSIUM SERPL-SCNC: 4 MMOL/L (ref 3.5–5.1)
PROT SERPL-MCNC: 7.7 G/DL (ref 6.3–8.2)
RBC # BLD AUTO: 5.07 M/UL (ref 4.05–5.2)
SODIUM SERPL-SCNC: 139 MMOL/L (ref 136–145)
WBC # BLD AUTO: 7.7 K/UL (ref 4.3–11.1)

## 2021-08-17 PROCEDURE — 80053 COMPREHEN METABOLIC PANEL: CPT

## 2021-08-17 PROCEDURE — 93971 EXTREMITY STUDY: CPT

## 2021-08-17 PROCEDURE — 93005 ELECTROCARDIOGRAM TRACING: CPT | Performed by: EMERGENCY MEDICINE

## 2021-08-17 PROCEDURE — 83880 ASSAY OF NATRIURETIC PEPTIDE: CPT

## 2021-08-17 PROCEDURE — 85025 COMPLETE CBC W/AUTO DIFF WBC: CPT

## 2021-08-17 PROCEDURE — 99283 EMERGENCY DEPT VISIT LOW MDM: CPT

## 2021-08-17 RX ORDER — DICLOFENAC SODIUM 50 MG/1
50 TABLET, DELAYED RELEASE ORAL 2 TIMES DAILY
Qty: 20 TABLET | Refills: 0 | Status: SHIPPED | OUTPATIENT
Start: 2021-08-17

## 2021-08-17 RX ORDER — TRAMADOL HYDROCHLORIDE 50 MG/1
100 TABLET ORAL
Qty: 18 TABLET | Refills: 0 | Status: SHIPPED | OUTPATIENT
Start: 2021-08-17 | End: 2021-08-20

## 2021-08-17 RX ORDER — SODIUM CHLORIDE 0.9 % (FLUSH) 0.9 %
5-10 SYRINGE (ML) INJECTION EVERY 8 HOURS
Status: DISCONTINUED | OUTPATIENT
Start: 2021-08-17 | End: 2021-08-18 | Stop reason: HOSPADM

## 2021-08-17 RX ORDER — SODIUM CHLORIDE 0.9 % (FLUSH) 0.9 %
5-10 SYRINGE (ML) INJECTION AS NEEDED
Status: DISCONTINUED | OUTPATIENT
Start: 2021-08-17 | End: 2021-08-18 | Stop reason: HOSPADM

## 2021-08-18 LAB
ATRIAL RATE: 95 BPM
CALCULATED P AXIS, ECG09: 67 DEGREES
CALCULATED R AXIS, ECG10: 64 DEGREES
CALCULATED T AXIS, ECG11: 38 DEGREES
DIAGNOSIS, 93000: NORMAL
P-R INTERVAL, ECG05: 172 MS
Q-T INTERVAL, ECG07: 360 MS
QRS DURATION, ECG06: 72 MS
QTC CALCULATION (BEZET), ECG08: 452 MS
VENTRICULAR RATE, ECG03: 95 BPM

## 2021-08-18 NOTE — ED PROVIDER NOTES
56-year-old female presenting for evaluation of pain in her left lower extremity. She describes it as a sharp stabbing pain that started in her calf and outs rating up her leg. Woke her from sleep. Never had this before. She drove herself here and walked inside without assistance. Took nothing for it. Denies asymmetry or increased swelling. No change in color or sensation. The history is provided by the patient. Leg Pain   This is a new problem. The current episode started 1 to 2 hours ago. The problem occurs constantly. The problem has not changed since onset. The pain is present in the left lower leg. The quality of the pain is described as aching and sharp. The pain is at a severity of 5/10. The pain is moderate. The symptoms are aggravated by palpation, movement and standing. She has tried nothing for the symptoms. The treatment provided no relief. There has been no history of extremity trauma.         Past Medical History:   Diagnosis Date    GERD (gastroesophageal reflux disease) 4/28/2020    Ill-defined condition     hx migraines    Migraine without aura 1/22/2021    OA (osteoarthritis) 4/28/2020    SHAISTA (obstructive sleep apnea) 4/28/2020    TIA (transient ischemic attack) 1/22/2021       Past Surgical History:   Procedure Laterality Date    HX TUBAL LIGATION           Family History:   Problem Relation Age of Onset    Breast Cancer Cousin 40       Social History     Socioeconomic History    Marital status: SINGLE     Spouse name: Not on file    Number of children: Not on file    Years of education: Not on file    Highest education level: Not on file   Occupational History    Not on file   Tobacco Use    Smoking status: Never Smoker    Smokeless tobacco: Never Used   Vaping Use    Vaping Use: Never used   Substance and Sexual Activity    Alcohol use: Yes     Comment: occassionally    Drug use: No    Sexual activity: Never     Birth control/protection: Surgical   Other Topics Concern    Not on file   Social History Narrative    Not on file     Social Determinants of Health     Financial Resource Strain:     Difficulty of Paying Living Expenses:    Food Insecurity:     Worried About Running Out of Food in the Last Year:     [address] in the Last Year:    Transportation Needs:     Lack of Transportation (Medical):  Lack of Transportation (Non-Medical):    Physical Activity:     Days of Exercise per Week:     Minutes of Exercise per Session:    Stress:     Feeling of Stress :    Social Connections:     Frequency of Communication with Friends and Family:     Frequency of Social Gatherings with Friends and Family:     Attends Anabaptism Services:     Active Member of Clubs or Organizations:     Attends Club or Organization Meetings:     Marital Status:    Intimate Partner Violence:     Fear of Current or Ex-Partner:     Emotionally Abused:     Physically Abused:     Sexually Abused: ALLERGIES: Patient has no known allergies. Review of Systems   Musculoskeletal: Positive for gait problem and joint swelling. All other systems reviewed and are negative. Vitals:    08/17/21 2013   BP: 131/89   Pulse: 98   Resp: 18   Temp: 99.2 °F (37.3 °C)   SpO2: 97%   Weight: 117.9 kg (260 lb)   Height: 5' 11\" (1.803 m)            Physical Exam  Vitals and nursing note reviewed. Constitutional:       Appearance: She is well-developed. HENT:      Head: Normocephalic and atraumatic. Eyes:      Conjunctiva/sclera: Conjunctivae normal.      Pupils: Pupils are equal, round, and reactive to light. Cardiovascular:      Rate and Rhythm: Normal rate and regular rhythm. Pulmonary:      Effort: Pulmonary effort is normal.      Breath sounds: Normal breath sounds. Abdominal:      General: Bowel sounds are normal.      Palpations: Abdomen is soft. Musculoskeletal:         General: Tenderness present. Normal range of motion. Cervical back: Neck supple. Comments: Tenderness in the left calf   Skin:     General: Skin is warm and dry. Neurological:      Mental Status: She is alert and oriented to person, place, and time. MDM  Number of Diagnoses or Management Options  Arthralgia of left lower leg  Diagnosis management comments: Pleasant 28-year-old female presenting for left calf pain. Ultrasound was performed from triage reportedly unremarkable but awaiting radiologist interpretation. We will check basic labs and treat the patient's symptoms.   My suspicion is is a muscle spasm       Amount and/or Complexity of Data Reviewed  Clinical lab tests: ordered and reviewed (Results for orders placed or performed during the hospital encounter of 08/17/21  -CBC WITH AUTOMATED DIFF       Result                      Value             Ref Range           WBC                         7.7               4.3 - 11.1 K*       RBC                         5.07              4.05 - 5.2 M*       HGB                         12.1              11.7 - 15.4 *       HCT                         39.2              35.8 - 46.3 %       MCV                         77.3 (L)          79.6 - 97.8 *       MCH                         23.9 (L)          26.1 - 32.9 *       MCHC                        30.9 (L)          31.4 - 35.0 *       RDW                         15.9 (H)          11.9 - 14.6 %       PLATELET                    332               150 - 450 K/*       MPV                         9.4               9.4 - 12.3 FL       ABSOLUTE NRBC               0.00              0.0 - 0.2 K/*       DF                          AUTOMATED                             NEUTROPHILS                 66                43 - 78 %           LYMPHOCYTES                 25                13 - 44 %           MONOCYTES                   6                 4.0 - 12.0 %        EOSINOPHILS                 3                 0.5 - 7.8 %         BASOPHILS                   0                 0.0 - 2.0 %         IMMATURE GRANULOCYTES       0                 0.0 - 5.0 %         ABS. NEUTROPHILS            5.1               1.7 - 8.2 K/*       ABS. LYMPHOCYTES            1.9               0.5 - 4.6 K/*       ABS. MONOCYTES              0.4               0.1 - 1.3 K/*       ABS. EOSINOPHILS            0.3               0.0 - 0.8 K/*       ABS. BASOPHILS              0.0               0.0 - 0.2 K/*       ABS. IMM. GRANS.            0.0               0.0 - 0.5 K/*  -METABOLIC PANEL, COMPREHENSIVE       Result                      Value             Ref Range           Sodium                      139               136 - 145 mm*       Potassium                   4.0               3.5 - 5.1 mm*       Chloride                    108 (H)           98 - 107 mmo*       CO2                         24                21 - 32 mmol*       Anion gap                   7                 7 - 16 mmol/L       Glucose                     103 (H)           65 - 100 mg/*       BUN                         7                 6 - 23 MG/DL        Creatinine                  0.84              0.6 - 1.0 MG*       GFR est AA                  >60               >60 ml/min/1*       GFR est non-AA              >60               >60 ml/min/1*       Calcium                     9.4               8.3 - 10.4 M*       Bilirubin, total            0.3               0.2 - 1.1 MG*       ALT (SGPT)                  22                12 - 65 U/L         AST (SGOT)                  16                15 - 37 U/L         Alk.  phosphatase            67                50 - 136 U/L        Protein, total              7.7               6.3 - 8.2 g/*       Albumin                     3.3 (L)           3.5 - 5.0 g/*       Globulin                    4.4 (H)           2.3 - 3.5 g/*       A-G Ratio                   0.8 (L)           1.2 - 3.5      -NT-PRO BNP       Result                      Value             Ref Range           NT pro-BNP                  31                5 - 125 PG/ML  -EKG, 12 LEAD, INITIAL       Result                      Value             Ref Range           Ventricular Rate            95                BPM                 Atrial Rate                 95                BPM                 P-R Interval                172               ms                  QRS Duration                72                ms                  Q-T Interval                360               ms                  QTC Calculation (Bezet)     452               ms                  Calculated P Axis           67                degrees             Calculated R Axis           64                degrees             Calculated T Axis           38                degrees             Diagnosis                                                     Normal sinus rhythm   Normal ECG   When compared with ECG of 24-FEB-2021 19:55,   No significant change was found   Confirmed by [facility]  MD ()BEVERLY ([id number]) on 8/18/2021 6:30:40 AM     )  Tests in the radiology section of CPT®: ordered and reviewed (DUPLEX LOWER EXT VENOUS LEFT    Result Date: 8/17/2021  Left lower extremity duplex venous doppler exam. INDICATION: Leg pain. TECHNIQUE: Gray-scale ultrasound with and without compression and color Doppler evaluation were performed of the deep veins of the left lower extremity from the level of the left common femoral vein to the level of the left popliteal vein. Peroneal and posterior tibial veins also interrogated. FINDINGS: The left common femoral vein, left femoral vein, peroneal, posterior tibial, and left popliteal veins are compressible and opacify with color at color Doppler evaluation. There is no evidence of deep vein thrombosis.      No evidence of deep vein thrombosis.     )  Tests in the medicine section of CPT®: ordered and reviewed  Independent visualization of images, tracings, or specimens: yes    Risk of Complications, Morbidity, and/or Mortality  Presenting problems: high  Diagnostic procedures: high  Management options: moderate  General comments: I personally reviewed the patient's vital signs, laboratory tests, and/or radiological findings. I discussed these findings with the patient and their significance. I answered all questions and gave the patient clear return precautions.   The patient was discharged from the emergency department in stable condition        Patient Progress  Patient progress: improved    ED Course as of Aug 22 1634   Tue Aug 17, 2021   2051 DVT scan is normal.    [JS]   2105 EKG performed shows sinus rhythm rate 95, normal axis, FL is 172, QRS is 72, QTc is 452 with no acute ischemic change    [JS]      ED Course User Index  [JS] Andrew Valdes MD       Procedures

## 2021-08-18 NOTE — ED NOTES
I have reviewed discharge instructions with the patient. The patient verbalized understanding. Patient left ED via Discharge Method: ambulatory to Home with herself. Opportunity for questions and clarification provided. Patient given 2 scripts. To continue your aftercare when you leave the hospital, you may receive an automated call from our care team to check in on how you are doing. This is a free service and part of our promise to provide the best care and service to meet your aftercare needs.  If you have questions, or wish to unsubscribe from this service please call [phone]. Thank you for Choosing our [facility] Emergency Department.

## 2021-08-18 NOTE — ED TRIAGE NOTES
Pt reports pain and swelling to the left leg; the pain started in the lower leg and has now moved to the upper leg.

## 2022-03-18 PROBLEM — M19.90 OA (OSTEOARTHRITIS): Status: ACTIVE | Noted: 2020-04-28

## 2022-03-19 PROBLEM — G47.33 OSA (OBSTRUCTIVE SLEEP APNEA): Status: ACTIVE | Noted: 2020-04-28

## 2022-03-19 PROBLEM — G43.909 MIGRAINES: Status: ACTIVE | Noted: 2021-01-22

## 2022-03-19 PROBLEM — K21.9 GERD (GASTROESOPHAGEAL REFLUX DISEASE): Status: ACTIVE | Noted: 2020-04-28

## 2022-03-19 PROBLEM — D25.9 UTERINE LEIOMYOMA: Status: ACTIVE | Noted: 2020-05-26

## 2022-03-19 PROBLEM — E55.9 VITAMIN D DEFICIENCY: Status: ACTIVE | Noted: 2021-02-09

## 2022-03-20 PROBLEM — E78.5 HLD (HYPERLIPIDEMIA): Status: ACTIVE | Noted: 2020-05-08

## 2022-03-20 PROBLEM — D50.9 IDA (IRON DEFICIENCY ANEMIA): Status: ACTIVE | Noted: 2020-05-19

## 2022-03-20 PROBLEM — I87.2 VENOUS INSUFFICIENCY (CHRONIC) (PERIPHERAL): Status: ACTIVE | Noted: 2021-02-09

## 2022-03-20 PROBLEM — F41.0 GENERALIZED ANXIETY DISORDER WITH PANIC ATTACKS: Status: ACTIVE | Noted: 2021-02-09

## 2022-03-20 PROBLEM — F41.1 GENERALIZED ANXIETY DISORDER WITH PANIC ATTACKS: Status: ACTIVE | Noted: 2021-02-09

## 2022-03-20 PROBLEM — G45.9 TIA (TRANSIENT ISCHEMIC ATTACK): Status: ACTIVE | Noted: 2021-01-22

## 2022-06-29 ENCOUNTER — HOSPITAL ENCOUNTER (EMERGENCY)
Age: 52
Discharge: HOME | End: 2022-06-29
Attending: STUDENT IN AN ORGANIZED HEALTH CARE EDUCATION/TRAINING PROGRAM

## 2022-06-29 VITALS
HEIGHT: 71 IN | RESPIRATION RATE: 15 BRPM | HEART RATE: 64 BPM | WEIGHT: 260 LBS | BODY MASS INDEX: 36.4 KG/M2 | OXYGEN SATURATION: 97 % | TEMPERATURE: 98.5 F | DIASTOLIC BLOOD PRESSURE: 85 MMHG | SYSTOLIC BLOOD PRESSURE: 133 MMHG

## 2022-06-29 DIAGNOSIS — M79.601 PAIN OF RIGHT UPPER EXTREMITY: Primary | ICD-10-CM

## 2022-06-29 LAB
ALBUMIN SERPL-MCNC: 3.6 G/DL (ref 3.5–5)
ALBUMIN/GLOB SERPL: 0.8 {RATIO} (ref 1.2–3.5)
ALP SERPL-CCNC: 75 U/L (ref 50–136)
ALT SERPL-CCNC: 18 U/L (ref 12–65)
ANION GAP SERPL CALC-SCNC: 8 MMOL/L (ref 7–16)
AST SERPL-CCNC: 33 U/L (ref 15–37)
BILIRUB SERPL-MCNC: 0.3 MG/DL (ref 0.2–1.1)
BUN SERPL-MCNC: 12 MG/DL (ref 6–23)
CALCIUM SERPL-MCNC: 9.4 MG/DL (ref 8.3–10.4)
CHLORIDE SERPL-SCNC: 107 MMOL/L (ref 98–107)
CO2 SERPL-SCNC: 25 MMOL/L (ref 21–32)
CREAT SERPL-MCNC: 1 MG/DL (ref 0.6–1)
D DIMER PPP FEU-MCNC: <0.27 UG/ML(FEU)
EKG ATRIAL RATE: 94 BPM
EKG DIAGNOSIS: NORMAL
EKG P AXIS: 58 DEGREES
EKG P-R INTERVAL: 180 MS
EKG Q-T INTERVAL: 332 MS
EKG QRS DURATION: 78 MS
EKG QTC CALCULATION (BAZETT): 413 MS
EKG R AXIS: 34 DEGREES
EKG T AXIS: 14 DEGREES
EKG VENTRICULAR RATE: 93 BPM
ERYTHROCYTE [DISTWIDTH] IN BLOOD BY AUTOMATED COUNT: 16.4 % (ref 11.9–14.6)
GLOBULIN SER CALC-MCNC: 4.6 G/DL (ref 2.3–3.5)
GLUCOSE SERPL-MCNC: 99 MG/DL (ref 65–100)
HCT VFR BLD AUTO: 38.6 % (ref 35.8–46.3)
HGB BLD-MCNC: 11.6 G/DL (ref 11.7–15.4)
LIPASE SERPL-CCNC: 150 U/L (ref 73–393)
MCH RBC QN AUTO: 22.2 PG (ref 26.1–32.9)
MCHC RBC AUTO-ENTMCNC: 30.1 G/DL (ref 31.4–35)
MCV RBC AUTO: 73.9 FL (ref 79.6–97.8)
NRBC # BLD: 0 K/UL (ref 0–0.2)
PLATELET # BLD AUTO: 381 K/UL (ref 150–450)
PMV BLD AUTO: 9.8 FL (ref 9.4–12.3)
POTASSIUM SERPL-SCNC: 4.2 MMOL/L (ref 3.5–5.1)
PROT SERPL-MCNC: 8.2 G/DL (ref 6.3–8.2)
RBC # BLD AUTO: 5.22 M/UL (ref 4.05–5.2)
SODIUM SERPL-SCNC: 140 MMOL/L (ref 136–145)
TROPONIN I SERPL HS-MCNC: 3.3 PG/ML (ref 0–14)
WBC # BLD AUTO: 7.4 K/UL (ref 4.3–11.1)

## 2022-06-29 PROCEDURE — 85027 COMPLETE CBC AUTOMATED: CPT

## 2022-06-29 PROCEDURE — 85379 FIBRIN DEGRADATION QUANT: CPT

## 2022-06-29 PROCEDURE — 83690 ASSAY OF LIPASE: CPT

## 2022-06-29 PROCEDURE — 99284 EMERGENCY DEPT VISIT MOD MDM: CPT

## 2022-06-29 PROCEDURE — 93005 ELECTROCARDIOGRAM TRACING: CPT | Performed by: STUDENT IN AN ORGANIZED HEALTH CARE EDUCATION/TRAINING PROGRAM

## 2022-06-29 PROCEDURE — 80053 COMPREHEN METABOLIC PANEL: CPT

## 2022-06-29 PROCEDURE — 84484 ASSAY OF TROPONIN QUANT: CPT

## 2022-06-29 RX ORDER — SODIUM CHLORIDE 0.9 % (FLUSH) 0.9 %
3 SYRINGE (ML) INJECTION EVERY 8 HOURS
Status: DISCONTINUED | OUTPATIENT
Start: 2022-06-29 | End: 2022-06-29 | Stop reason: HOSPADM

## 2022-06-29 ASSESSMENT — PAIN SCALES - GENERAL: PAINLEVEL_OUTOF10: 6

## 2022-06-29 ASSESSMENT — ENCOUNTER SYMPTOMS
PHOTOPHOBIA: 0
COUGH: 0
DIARRHEA: 0
SORE THROAT: 0
NAUSEA: 0
VOMITING: 0
SHORTNESS OF BREATH: 0

## 2022-06-29 ASSESSMENT — PAIN DESCRIPTION - LOCATION: LOCATION: ARM

## 2022-06-29 ASSESSMENT — PAIN - FUNCTIONAL ASSESSMENT: PAIN_FUNCTIONAL_ASSESSMENT: 0-10

## 2022-06-29 NOTE — ED NOTES
I have reviewed discharge instructions with the patient. The patient verbalized understanding. Patient left ED via Discharge Method: ambulatory to Home with self. Opportunity for questions and clarification provided. Patient given 0 scripts. To continue your aftercare when you leave the hospital, you may receive an automated call from our care team to check in on how you are doing. This is a free service and part of our promise to provide the best care and service to meet your aftercare needs.  If you have questions, or wish to unsubscribe from this service please call [phone]. Thank you for Choosing our [facility] Emergency Department.         Joanne Rubin RN  06/29/22 [id number]

## 2022-06-29 NOTE — ED TRIAGE NOTES
Pt states that she is having right arm pain since yesterday     States that it is hurting really bad in the [facility] area     Pt also states she is feeling nauseated and feeling weird.

## 2023-05-10 RX ORDER — BUTALBITAL, ACETAMINOPHEN AND CAFFEINE 50; 325; 40 MG/1; MG/1; MG/1
TABLET ORAL
COMMUNITY

## 2023-05-10 RX ORDER — COVID-19 ANTIGEN TEST
KIT MISCELLANEOUS PRN
COMMUNITY

## 2023-05-10 RX ORDER — ERGOCALCIFEROL 1.25 MG/1
CAPSULE ORAL
COMMUNITY

## 2023-05-10 RX ORDER — VITAMINS B1,B2,B3,B5,AND B6 100-2MG/ML
VIAL (ML) INJECTION DAILY
COMMUNITY

## 2023-06-28 ENCOUNTER — HOSPITAL ENCOUNTER (EMERGENCY)
Age: 53
Discharge: HOME | End: 2023-06-28
Attending: EMERGENCY MEDICINE

## 2023-06-28 ENCOUNTER — APPOINTMENT (OUTPATIENT)
Dept: CT IMAGING | Age: 53
End: 2023-06-28

## 2023-06-28 VITALS
HEART RATE: 72 BPM | TEMPERATURE: 98.2 F | DIASTOLIC BLOOD PRESSURE: 64 MMHG | RESPIRATION RATE: 16 BRPM | BODY MASS INDEX: 36.4 KG/M2 | SYSTOLIC BLOOD PRESSURE: 114 MMHG | OXYGEN SATURATION: 96 % | HEIGHT: 71 IN | WEIGHT: 260 LBS

## 2023-06-28 DIAGNOSIS — G43.809 OTHER MIGRAINE WITHOUT STATUS MIGRAINOSUS, NOT INTRACTABLE: Primary | ICD-10-CM

## 2023-06-28 LAB
ALBUMIN SERPL-MCNC: 3.4 G/DL (ref 3.5–5)
ALBUMIN/GLOB SERPL: 0.9 (ref 0.4–1.6)
ALP SERPL-CCNC: 72 U/L (ref 50–130)
ALT SERPL-CCNC: 16 U/L (ref 12–65)
ANION GAP SERPL CALC-SCNC: 5 MMOL/L (ref 2–11)
AST SERPL-CCNC: 26 U/L (ref 15–37)
BASOPHILS # BLD: 0.1 K/UL (ref 0–0.2)
BASOPHILS NFR BLD: 1 % (ref 0–2)
BILIRUB SERPL-MCNC: 0.4 MG/DL (ref 0.2–1.1)
BUN SERPL-MCNC: 13 MG/DL (ref 6–23)
CALCIUM SERPL-MCNC: 9.7 MG/DL (ref 8.3–10.4)
CHLORIDE SERPL-SCNC: 112 MMOL/L (ref 101–110)
CO2 SERPL-SCNC: 23 MMOL/L (ref 21–32)
CREAT SERPL-MCNC: 1 MG/DL (ref 0.6–1)
DIFFERENTIAL METHOD BLD: ABNORMAL
EOSINOPHIL # BLD: 0.2 K/UL (ref 0–0.8)
EOSINOPHIL NFR BLD: 3 % (ref 0.5–7.8)
ERYTHROCYTE [DISTWIDTH] IN BLOOD BY AUTOMATED COUNT: 16.1 % (ref 11.9–14.6)
GLOBULIN SER CALC-MCNC: 4 G/DL (ref 2.8–4.5)
GLUCOSE SERPL-MCNC: 97 MG/DL (ref 65–100)
HCT VFR BLD AUTO: 36.4 % (ref 35.8–46.3)
HGB BLD-MCNC: 10.4 G/DL (ref 11.7–15.4)
IMM GRANULOCYTES # BLD AUTO: 0 K/UL (ref 0–0.5)
IMM GRANULOCYTES NFR BLD AUTO: 0 % (ref 0–5)
LYMPHOCYTES # BLD: 2.1 K/UL (ref 0.5–4.6)
LYMPHOCYTES NFR BLD: 35 % (ref 13–44)
MCH RBC QN AUTO: 20.3 PG (ref 26.1–32.9)
MCHC RBC AUTO-ENTMCNC: 28.6 G/DL (ref 31.4–35)
MCV RBC AUTO: 71.1 FL (ref 82–102)
MONOCYTES # BLD: 0.5 K/UL (ref 0.1–1.3)
MONOCYTES NFR BLD: 8 % (ref 4–12)
NEUTS SEG # BLD: 3.2 K/UL (ref 1.7–8.2)
NEUTS SEG NFR BLD: 53 % (ref 43–78)
NRBC # BLD: 0 K/UL (ref 0–0.2)
PLATELET # BLD AUTO: 382 K/UL (ref 150–450)
PMV BLD AUTO: 8.7 FL (ref 9.4–12.3)
POTASSIUM SERPL-SCNC: 4.7 MMOL/L (ref 3.5–5.1)
PROT SERPL-MCNC: 7.4 G/DL (ref 6.3–8.2)
RBC # BLD AUTO: 5.12 M/UL (ref 4.05–5.2)
SODIUM SERPL-SCNC: 140 MMOL/L (ref 133–143)
WBC # BLD AUTO: 6.1 K/UL (ref 4.3–11.1)

## 2023-06-28 PROCEDURE — 96375 TX/PRO/DX INJ NEW DRUG ADDON: CPT

## 2023-06-28 PROCEDURE — 99284 EMERGENCY DEPT VISIT MOD MDM: CPT

## 2023-06-28 PROCEDURE — 96374 THER/PROPH/DIAG INJ IV PUSH: CPT

## 2023-06-28 PROCEDURE — 2580000003 HC RX 258

## 2023-06-28 PROCEDURE — 70450 CT HEAD/BRAIN W/O DYE: CPT

## 2023-06-28 PROCEDURE — 80053 COMPREHEN METABOLIC PANEL: CPT

## 2023-06-28 PROCEDURE — 6360000002 HC RX W HCPCS

## 2023-06-28 PROCEDURE — 85025 COMPLETE CBC W/AUTO DIFF WBC: CPT

## 2023-06-28 RX ORDER — 0.9 % SODIUM CHLORIDE 0.9 %
1000 INTRAVENOUS SOLUTION INTRAVENOUS ONCE
Status: COMPLETED | OUTPATIENT
Start: 2023-06-28 | End: 2023-06-28

## 2023-06-28 RX ORDER — BUTALBITAL, ASPIRIN, AND CAFFEINE 50; 325; 40 MG/1; MG/1; MG/1
1 CAPSULE ORAL EVERY 4 HOURS PRN
Qty: 42 CAPSULE | Refills: 0 | Status: SHIPPED | OUTPATIENT
Start: 2023-06-28 | End: 2023-07-05

## 2023-06-28 RX ORDER — DIPHENHYDRAMINE HYDROCHLORIDE 50 MG/ML
25 INJECTION INTRAMUSCULAR; INTRAVENOUS
Status: COMPLETED | OUTPATIENT
Start: 2023-06-28 | End: 2023-06-28

## 2023-06-28 RX ORDER — METOCLOPRAMIDE HYDROCHLORIDE 5 MG/ML
10 INJECTION INTRAMUSCULAR; INTRAVENOUS ONCE
Status: COMPLETED | OUTPATIENT
Start: 2023-06-28 | End: 2023-06-28

## 2023-06-28 RX ORDER — KETOROLAC TROMETHAMINE 30 MG/ML
30 INJECTION, SOLUTION INTRAMUSCULAR; INTRAVENOUS ONCE
Status: COMPLETED | OUTPATIENT
Start: 2023-06-28 | End: 2023-06-28

## 2023-06-28 RX ADMIN — METOCLOPRAMIDE 10 MG: 5 INJECTION, SOLUTION INTRAMUSCULAR; INTRAVENOUS at 14:59

## 2023-06-28 RX ADMIN — DIPHENHYDRAMINE HYDROCHLORIDE 25 MG: 50 INJECTION, SOLUTION INTRAMUSCULAR; INTRAVENOUS at 14:59

## 2023-06-28 RX ADMIN — KETOROLAC TROMETHAMINE 30 MG: 30 INJECTION, SOLUTION INTRAMUSCULAR; INTRAVENOUS at 14:59

## 2023-06-28 RX ADMIN — SODIUM CHLORIDE 1000 ML: 9 INJECTION, SOLUTION INTRAVENOUS at 14:59

## 2023-06-28 ASSESSMENT — PAIN - FUNCTIONAL ASSESSMENT: PAIN_FUNCTIONAL_ASSESSMENT: 0-10

## 2023-06-28 ASSESSMENT — PAIN SCALES - GENERAL
PAINLEVEL_OUTOF10: 4
PAINLEVEL_OUTOF10: 10
PAINLEVEL_OUTOF10: 4

## 2023-06-28 ASSESSMENT — ENCOUNTER SYMPTOMS
COLOR CHANGE: 0
DIARRHEA: 0
SHORTNESS OF BREATH: 0
VOMITING: 0
ABDOMINAL PAIN: 0
NAUSEA: 0

## 2023-06-28 ASSESSMENT — LIFESTYLE VARIABLES
HOW OFTEN DO YOU HAVE A DRINK CONTAINING ALCOHOL: 2-4 TIMES A MONTH
HOW MANY STANDARD DRINKS CONTAINING ALCOHOL DO YOU HAVE ON A TYPICAL DAY: 1 OR 2

## 2023-12-15 ENCOUNTER — APPOINTMENT (OUTPATIENT)
Dept: CT IMAGING | Age: 53
End: 2023-12-15

## 2023-12-15 ENCOUNTER — APPOINTMENT (OUTPATIENT)
Dept: GENERAL RADIOLOGY | Age: 53
End: 2023-12-15

## 2023-12-15 ENCOUNTER — HOSPITAL ENCOUNTER (EMERGENCY)
Age: 53
Discharge: HOME | End: 2023-12-15
Attending: EMERGENCY MEDICINE

## 2023-12-15 VITALS
DIASTOLIC BLOOD PRESSURE: 73 MMHG | TEMPERATURE: 97.8 F | OXYGEN SATURATION: 97 % | WEIGHT: 270 LBS | HEART RATE: 65 BPM | HEIGHT: 71 IN | SYSTOLIC BLOOD PRESSURE: 120 MMHG | RESPIRATION RATE: 18 BRPM | BODY MASS INDEX: 37.8 KG/M2

## 2023-12-15 DIAGNOSIS — J11.1 INFLUENZA: Primary | ICD-10-CM

## 2023-12-15 LAB
ANION GAP SERPL CALC-SCNC: 2 MMOL/L (ref 2–11)
BASOPHILS # BLD: 0 K/UL (ref 0–0.2)
BASOPHILS NFR BLD: 1 % (ref 0–2)
BUN SERPL-MCNC: 10 MG/DL (ref 6–23)
CALCIUM SERPL-MCNC: 8.5 MG/DL (ref 8.3–10.4)
CHLORIDE SERPL-SCNC: 109 MMOL/L (ref 103–113)
CO2 SERPL-SCNC: 27 MMOL/L (ref 21–32)
CREAT SERPL-MCNC: 0.95 MG/DL (ref 0.6–1)
DIFFERENTIAL METHOD BLD: ABNORMAL
EOSINOPHIL # BLD: 0.4 K/UL (ref 0–0.8)
EOSINOPHIL NFR BLD: 7 % (ref 0.5–7.8)
ERYTHROCYTE [DISTWIDTH] IN BLOOD BY AUTOMATED COUNT: 17.8 % (ref 11.9–14.6)
FLUAV RNA SPEC QL NAA+PROBE: DETECTED
FLUBV RNA SPEC QL NAA+PROBE: NOT DETECTED
GLUCOSE SERPL-MCNC: 130 MG/DL (ref 65–100)
HCT VFR BLD AUTO: 34.1 % (ref 35.8–46.3)
HGB BLD-MCNC: 9.9 G/DL (ref 11.7–15.4)
IMM GRANULOCYTES # BLD AUTO: 0 K/UL (ref 0–0.5)
IMM GRANULOCYTES NFR BLD AUTO: 0 % (ref 0–5)
LYMPHOCYTES # BLD: 1.6 K/UL (ref 0.5–4.6)
LYMPHOCYTES NFR BLD: 28 % (ref 13–44)
MCH RBC QN AUTO: 19.6 PG (ref 26.1–32.9)
MCHC RBC AUTO-ENTMCNC: 29 G/DL (ref 31.4–35)
MCV RBC AUTO: 67.4 FL (ref 82–102)
MONOCYTES # BLD: 0.4 K/UL (ref 0.1–1.3)
MONOCYTES NFR BLD: 6 % (ref 4–12)
NEUTS SEG # BLD: 3.4 K/UL (ref 1.7–8.2)
NEUTS SEG NFR BLD: 59 % (ref 43–78)
NRBC # BLD: 0 K/UL (ref 0–0.2)
PLATELET # BLD AUTO: 275 K/UL (ref 150–450)
PMV BLD AUTO: 8.5 FL (ref 9.4–12.3)
POTASSIUM SERPL-SCNC: 3.4 MMOL/L (ref 3.5–5.1)
RBC # BLD AUTO: 5.06 M/UL (ref 4.05–5.2)
SARS-COV-2 RDRP RESP QL NAA+PROBE: NOT DETECTED
SODIUM SERPL-SCNC: 138 MMOL/L (ref 136–146)
SOURCE: NORMAL
WBC # BLD AUTO: 5.8 K/UL (ref 4.3–11.1)

## 2023-12-15 PROCEDURE — 96361 HYDRATE IV INFUSION ADD-ON: CPT

## 2023-12-15 PROCEDURE — 99284 EMERGENCY DEPT VISIT MOD MDM: CPT

## 2023-12-15 PROCEDURE — 6360000002 HC RX W HCPCS: Performed by: EMERGENCY MEDICINE

## 2023-12-15 PROCEDURE — 87502 INFLUENZA DNA AMP PROBE: CPT

## 2023-12-15 PROCEDURE — 71045 X-RAY EXAM CHEST 1 VIEW: CPT

## 2023-12-15 PROCEDURE — 85025 COMPLETE CBC W/AUTO DIFF WBC: CPT

## 2023-12-15 PROCEDURE — 70450 CT HEAD/BRAIN W/O DYE: CPT

## 2023-12-15 PROCEDURE — 2580000003 HC RX 258: Performed by: EMERGENCY MEDICINE

## 2023-12-15 PROCEDURE — 80048 BASIC METABOLIC PNL TOTAL CA: CPT

## 2023-12-15 PROCEDURE — 87635 SARS-COV-2 COVID-19 AMP PRB: CPT

## 2023-12-15 PROCEDURE — 96374 THER/PROPH/DIAG INJ IV PUSH: CPT

## 2023-12-15 RX ORDER — 0.9 % SODIUM CHLORIDE 0.9 %
1000 INTRAVENOUS SOLUTION INTRAVENOUS ONCE
Status: COMPLETED | OUTPATIENT
Start: 2023-12-15 | End: 2023-12-15

## 2023-12-15 RX ORDER — BENZONATATE 100 MG/1
200 CAPSULE ORAL 3 TIMES DAILY PRN
Qty: 30 CAPSULE | Refills: 0 | Status: SHIPPED | OUTPATIENT
Start: 2023-12-15 | End: 2023-12-20

## 2023-12-15 RX ORDER — HYDROCODONE BITARTRATE AND ACETAMINOPHEN 5; 325 MG/1; MG/1
1 TABLET ORAL
Status: DISCONTINUED | OUTPATIENT
Start: 2023-12-15 | End: 2023-12-15 | Stop reason: HOSPADM

## 2023-12-15 RX ORDER — ONDANSETRON 2 MG/ML
4 INJECTION INTRAMUSCULAR; INTRAVENOUS ONCE
Status: COMPLETED | OUTPATIENT
Start: 2023-12-15 | End: 2023-12-15

## 2023-12-15 RX ADMIN — ONDANSETRON 4 MG: 2 INJECTION INTRAMUSCULAR; INTRAVENOUS at 03:38

## 2023-12-15 RX ADMIN — SODIUM CHLORIDE 1000 ML: 9 INJECTION, SOLUTION INTRAVENOUS at 03:37

## 2023-12-15 ASSESSMENT — LIFESTYLE VARIABLES
HOW OFTEN DO YOU HAVE A DRINK CONTAINING ALCOHOL: NEVER
HOW MANY STANDARD DRINKS CONTAINING ALCOHOL DO YOU HAVE ON A TYPICAL DAY: PATIENT DOES NOT DRINK

## 2023-12-15 ASSESSMENT — PAIN - FUNCTIONAL ASSESSMENT: PAIN_FUNCTIONAL_ASSESSMENT: 0-10

## 2023-12-15 ASSESSMENT — PAIN SCALES - GENERAL: PAINLEVEL_OUTOF10: 0

## 2023-12-15 NOTE — DISCHARGE INSTRUCTIONS
Please return with any fevers, vomiting, difficulty breathing, worsening symptoms, or additional concerns. Follow-up with your regular doctors for reevaluation as needed. To help you get a primary care doctor for follow-up after your emergency department visit, please call [phone] between [address] [facility] Monday to Friday. A care navigator will be able to assist you with setting up a doctor close to your home.

## 2023-12-15 NOTE — ED PROVIDER NOTES
Emergency Department Provider Note       PCP: None, None   Age: 48 y.o. Sex: female     DISPOSITION Decision To Discharge 12/15/2023 06:12:42 AM       ICD-10-CM    1. Influenza  J11.1           Medical Decision Making     With her symptoms I will get flu and COVID testing. I will check her basic blood work to look at her sodium, potassium, and kidney function. Given her headache I will get a CT scan to look for occult intracranial abnormality. Complexity of Problems Addressed:  1 acute illness with systemic symptoms    Data Reviewed and Analyzed:  I independently ordered and reviewed each unique test.     The patients assessment required an independent historian: EMS. The reason they were needed is prehospital assessment and treatment. I interpreted the X-rays no obvious infiltrate or effusion. Discussion of management or test interpretation. Risk of Complications and/or Morbidity of Patient Management:  Prescription drug management performed. Shared medical decision making was utilized in creating the patients health plan today. ED Course as of 12/15/23 0614   Fri Dec 15, 2023   0411 Patient is flu positive. [AC]   0411 I do not think she is having an acute neurologic event. Pending the results of her metabolic panel and x-ray if those are reasonable I will discharge her home. She says she thinks her symptoms started on Tuesday which puts her outside the 48-hour window for Tamiflu treatment. [AC]      ED Course User Index  [AC] Glendy Cabral MD       History       80-year-old lady presents with concerns about not feeling well. She says she has a headache, cough, and general just feels off. EMS says that on their arrival she also was having some stuttering. That apparently started tonight. Patient says that the headache is all across the front of her head. She says her cough has been nonproductive. She says she has had some body aches.     She denies any specific fevers or

## 2023-12-15 NOTE — ED TRIAGE NOTES
Patient arrives via EMS from home. Patient reports waking up to use bathroom around an hour ago and she started feeling weird. Reports left sided weakness/decreased sensation. Reports sudden onset of stutter. Hx of TIA. EMS vitals 182/88, 120, 98% room air, .

## 2024-01-22 NOTE — PROGRESS NOTES
NEW PATIENT INTAKE    Referral Diagnosis: Chronic anemia    Referring Provider: Houston Gann MD    Primary Care Provider: Marcelo Arevalo MD     Family History of Cancer/ Hematology Disorders: Family history significant for cousin with breast cancer.    Presenting Symptoms: Chronic anemia    Chronological History of Pertinent Events:     53-year-old black female    PMH: History of migraines without aura, History of TIA, GERD, hyperlipidemia    6/28/23 - Patient went to ED (EPIC)  c\o migraine for the past few weeks.  Patient states she has a history of migraines.  Abnormal labs (6/28/23): (EPIC)  Hgb - 10.4  MCV - 71.1  MCH - 20.3  MPV - 8.7  RDW - 16.1  Chloride - 112  Albumin - 3.4  CT HEAD WO CONTRAST revealing No evidence of acute territorial infarct, intracranial hemorrhage, or mass effect.    12/15/23 - Patient went to ED (EPIC)  Patient reports waking up to use bathroom around an hour ago and she started feeling weird.   Reports left sided weakness/decreased sensation. Reports sudden onset of stutter. Hx of TIA.   Diagnosed with influenza.  Patient discharged same day.  Abnormal labs (12/15/23): (EPIC)  Hgb - 9.9  Hct - 34.1  MCV - 67.4  MCH - 19.6  MCHC - 29.0  MPV - 8.5  RDW - 17.8  Potassium - 3.4  Glucose - 130    12/15/23 - CT HEAD WO CONTRAST (EPIC)  IMPRESSION:  No CT evidence of acute intracranial abnormality.    12/15/23 - XR CHEST PORTABLE (EPIC)  IMPRESSION:  No acute findings in the chest    12/21/23 - Patient returned to ED (EPIC)  Patient reports waking up and felt like her heart was beating really fast.   Also endorses confusion and sudden onset of stutter.   c/o chest tightness, palpitations, light-headedness.  Patient was seen here recently with the same complaints, dx with flu.   States these palpitations have occurred years ago as well.   Denies chest pain, SOB. Endorses cough r/t flu.  Coagulation studies WNL.  Abnormal labs (12/21/23): (EPIC)  Hgb - 10.1  Hct - 35.7  MCV - 70.3  MCH

## 2024-01-23 ENCOUNTER — OFFICE VISIT (OUTPATIENT)
Dept: ONCOLOGY | Age: 54
End: 2024-01-23

## 2024-01-23 ENCOUNTER — HOSPITAL ENCOUNTER (OUTPATIENT)
Dept: LAB | Age: 54
Discharge: HOME | End: 2024-01-26

## 2024-01-23 VITALS
HEIGHT: 71 IN | SYSTOLIC BLOOD PRESSURE: 140 MMHG | OXYGEN SATURATION: 95 % | DIASTOLIC BLOOD PRESSURE: 84 MMHG | TEMPERATURE: 98.2 F | RESPIRATION RATE: 16 BRPM | HEART RATE: 73 BPM | BODY MASS INDEX: 36.47 KG/M2 | WEIGHT: 260.5 LBS

## 2024-01-23 DIAGNOSIS — D57.3 SICKLE CELL TRAIT (HCC): ICD-10-CM

## 2024-01-23 DIAGNOSIS — D50.8 OTHER IRON DEFICIENCY ANEMIAS: Primary | ICD-10-CM

## 2024-01-23 DIAGNOSIS — E55.9 VITAMIN D DEFICIENCY: ICD-10-CM

## 2024-01-23 DIAGNOSIS — D50.8 OTHER IRON DEFICIENCY ANEMIAS: ICD-10-CM

## 2024-01-23 LAB
25(OH)D3 SERPL-MCNC: 17.4 NG/ML (ref 30–100)
ALBUMIN SERPL-MCNC: 3.5 G/DL (ref 3.5–5)
ALBUMIN/GLOB SERPL: 0.8 (ref 0.4–1.6)
ALP SERPL-CCNC: 82 U/L (ref 50–136)
ALT SERPL-CCNC: 21 U/L (ref 12–65)
ANION GAP SERPL CALC-SCNC: 5 MMOL/L (ref 2–11)
AST SERPL-CCNC: 16 U/L (ref 15–37)
BASOPHILS # BLD: 0.1 K/UL (ref 0–0.2)
BASOPHILS NFR BLD: 1 % (ref 0–2)
BILIRUB SERPL-MCNC: 0.3 MG/DL (ref 0.2–1.1)
BUN SERPL-MCNC: 9 MG/DL (ref 6–23)
CALCIUM SERPL-MCNC: 9.5 MG/DL (ref 8.3–10.4)
CHLORIDE SERPL-SCNC: 108 MMOL/L (ref 103–113)
CO2 SERPL-SCNC: 25 MMOL/L (ref 21–32)
CREAT SERPL-MCNC: 1.1 MG/DL (ref 0.6–1)
DIFFERENTIAL METHOD BLD: ABNORMAL
EOSINOPHIL # BLD: 0.2 K/UL (ref 0–0.8)
EOSINOPHIL NFR BLD: 3 % (ref 0.5–7.8)
ERYTHROCYTE [DISTWIDTH] IN BLOOD BY AUTOMATED COUNT: 22.4 % (ref 11.9–14.6)
FERRITIN SERPL-MCNC: 12 NG/ML (ref 8–388)
GLOBULIN SER CALC-MCNC: 4.3 G/DL (ref 2.8–4.5)
GLUCOSE SERPL-MCNC: 102 MG/DL (ref 65–100)
HCT VFR BLD AUTO: 39.8 % (ref 35.8–46.3)
HGB BLD-MCNC: 11.7 G/DL (ref 11.7–15.4)
IMM GRANULOCYTES # BLD AUTO: 0 K/UL (ref 0–0.5)
IMM GRANULOCYTES NFR BLD AUTO: 0 % (ref 0–5)
LYMPHOCYTES # BLD: 1.9 K/UL (ref 0.5–4.6)
LYMPHOCYTES NFR BLD: 28 % (ref 13–44)
MCH RBC QN AUTO: 21.3 PG (ref 26.1–32.9)
MCHC RBC AUTO-ENTMCNC: 29.4 G/DL (ref 31.4–35)
MCV RBC AUTO: 72.4 FL (ref 82–102)
MONOCYTES # BLD: 0.4 K/UL (ref 0.1–1.3)
MONOCYTES NFR BLD: 6 % (ref 4–12)
NEUTS SEG # BLD: 4.1 K/UL (ref 1.7–8.2)
NEUTS SEG NFR BLD: 62 % (ref 43–78)
NRBC # BLD: 0 K/UL (ref 0–0.2)
PLATELET # BLD AUTO: 482 K/UL (ref 150–450)
PMV BLD AUTO: 9.3 FL (ref 9.4–12.3)
POTASSIUM SERPL-SCNC: 4 MMOL/L (ref 3.5–5.1)
PROT SERPL-MCNC: 7.8 G/DL (ref 6.3–8.2)
RBC # BLD AUTO: 5.5 M/UL (ref 4.05–5.2)
SODIUM SERPL-SCNC: 138 MMOL/L (ref 136–146)
VIT B12 SERPL-MCNC: 622 PG/ML (ref 193–986)
WBC # BLD AUTO: 6.7 K/UL (ref 4.3–11.1)

## 2024-01-23 PROCEDURE — 86334 IMMUNOFIX E-PHORESIS SERUM: CPT

## 2024-01-23 PROCEDURE — 99205 OFFICE O/P NEW HI 60 MIN: CPT | Performed by: INTERNAL MEDICINE

## 2024-01-23 PROCEDURE — 84165 PROTEIN E-PHORESIS SERUM: CPT

## 2024-01-23 PROCEDURE — 82306 VITAMIN D 25 HYDROXY: CPT

## 2024-01-23 PROCEDURE — 36415 COLL VENOUS BLD VENIPUNCTURE: CPT

## 2024-01-23 PROCEDURE — 85025 COMPLETE CBC W/AUTO DIFF WBC: CPT

## 2024-01-23 PROCEDURE — 81257 HBA1/HBA2 GENE: CPT

## 2024-01-23 PROCEDURE — 82728 ASSAY OF FERRITIN: CPT

## 2024-01-23 PROCEDURE — 82607 VITAMIN B-12: CPT

## 2024-01-23 PROCEDURE — 80053 COMPREHEN METABOLIC PANEL: CPT

## 2024-01-23 PROCEDURE — 83020 HEMOGLOBIN ELECTROPHORESIS: CPT

## 2024-01-23 PROCEDURE — 82784 ASSAY IGA/IGD/IGG/IGM EACH: CPT

## 2024-01-23 RX ORDER — HEPARIN 100 UNIT/ML
500 SYRINGE INTRAVENOUS PRN
OUTPATIENT
Start: 2024-01-30

## 2024-01-23 RX ORDER — SULFAMETHOXAZOLE AND TRIMETHOPRIM 800; 160 MG/1; MG/1
TABLET ORAL
COMMUNITY
Start: 2024-01-14

## 2024-01-23 RX ORDER — SODIUM CHLORIDE 0.9 % (FLUSH) 0.9 %
5-40 SYRINGE (ML) INJECTION PRN
Status: CANCELLED | OUTPATIENT
Start: 2024-01-30

## 2024-01-23 RX ORDER — SODIUM CHLORIDE 9 MG/ML
5-250 INJECTION, SOLUTION INTRAVENOUS PRN
OUTPATIENT
Start: 2024-01-30

## 2024-01-23 RX ORDER — SODIUM CHLORIDE 9 MG/ML
5-250 INJECTION, SOLUTION INTRAVENOUS PRN
Status: CANCELLED | OUTPATIENT
Start: 2024-01-30

## 2024-01-23 RX ORDER — ONDANSETRON 2 MG/ML
8 INJECTION INTRAMUSCULAR; INTRAVENOUS
Status: CANCELLED | OUTPATIENT
Start: 2024-01-30

## 2024-01-23 RX ORDER — SODIUM CHLORIDE 9 MG/ML
INJECTION, SOLUTION INTRAVENOUS CONTINUOUS
Status: CANCELLED | OUTPATIENT
Start: 2024-01-30

## 2024-01-23 RX ORDER — ACETAMINOPHEN 325 MG/1
650 TABLET ORAL
Status: CANCELLED | OUTPATIENT
Start: 2024-01-30

## 2024-01-23 RX ORDER — ERGOCALCIFEROL 1.25 MG/1
50000 CAPSULE ORAL DAILY
Qty: 14 CAPSULE | Refills: 0 | Status: SHIPPED | OUTPATIENT
Start: 2024-01-23 | End: 2024-02-06

## 2024-01-23 RX ORDER — OMEPRAZOLE 40 MG/1
CAPSULE, DELAYED RELEASE ORAL
COMMUNITY
Start: 2020-04-28

## 2024-01-23 RX ORDER — EPINEPHRINE 1 MG/ML
0.3 INJECTION, SOLUTION, CONCENTRATE INTRAVENOUS PRN
Status: CANCELLED | OUTPATIENT
Start: 2024-01-30

## 2024-01-23 RX ORDER — ATORVASTATIN CALCIUM 40 MG/1
TABLET, FILM COATED ORAL
COMMUNITY
Start: 2020-05-19

## 2024-01-23 RX ORDER — ALBUTEROL SULFATE 90 UG/1
4 AEROSOL, METERED RESPIRATORY (INHALATION) PRN
Status: CANCELLED | OUTPATIENT
Start: 2024-01-30

## 2024-01-23 RX ORDER — FERROUS SULFATE 325(65) MG
TABLET ORAL
COMMUNITY

## 2024-01-23 RX ORDER — DIPHENHYDRAMINE HYDROCHLORIDE 50 MG/ML
50 INJECTION INTRAMUSCULAR; INTRAVENOUS
Status: CANCELLED | OUTPATIENT
Start: 2024-01-30

## 2024-01-23 ASSESSMENT — PATIENT HEALTH QUESTIONNAIRE - PHQ9
1. LITTLE INTEREST OR PLEASURE IN DOING THINGS: 0
SUM OF ALL RESPONSES TO PHQ QUESTIONS 1-9: 0
SUM OF ALL RESPONSES TO PHQ QUESTIONS 1-9: 0
SUM OF ALL RESPONSES TO PHQ9 QUESTIONS 1 & 2: 0
SUM OF ALL RESPONSES TO PHQ QUESTIONS 1-9: 0
2. FEELING DOWN, DEPRESSED OR HOPELESS: 0
SUM OF ALL RESPONSES TO PHQ QUESTIONS 1-9: 0

## 2024-01-23 NOTE — PATIENT INSTRUCTIONS
Patient Instructions from Today's Visit    Reason for Visit:  New Patient    Diagnosis Information:  [email or web address].net/about-us/[email or web address]-answers-patient-[email or web address]-[email or web address]-[email or web address]-[email or web address]-sheets       Plan:  You will have some blood work done today  Vitamin D level is low, so we will send in a prescription for Vitamin D 50,000 units    -take 1 pill daily for 14 days    -when you finish the prescription, continue taking over the counter Vitamin D 1,000 units daily  We will also schedule you for IV Iron Infusions (2)    -these infusions will be done at our office    Follow Up:  We will bring you back in March for a follow up with  and lab work prior.    Recent Lab Results:  Lab work done today downstairs            -------------------------------------------------------------------------------------------------------------------  Please call our office at [phone] if you have any  of the following symptoms:   Fever of 100.5 or greater  Chills  Shortness of breath  Swelling or pain in one leg    After office hours an answering service is available and will contact a provider for emergencies or if you are experiencing any of the above symptoms.    Patient has My Chart.  My Chart log in information explained on the after visit summary printout at the check-out desk.    Allegra Jerome MA            
[phone]

## 2024-01-23 NOTE — PROGRESS NOTES
[place] [id number]  Phone: [phone]        1/23/2024  Cher Trujillo  1970  [id number]      Cher Trujillo is a 53 year old -American female who has been sent to my clinic by Dr. Houston Gann for management of Iron Deficiency Anemia.      ALLERGIES:    No known drug allergies.      FAMILY HISTORY:   Her mother has Sickle Cell Trait.      SOCIAL HISTORY:    She is single and lives alone. She used to work as a . She denies ever using any tobacco products.      PAST MEDICAL HISTORY:    Migraines, GERD, Obstructive Sleep Apnea, Hyperlipidemia, TIA, Vitamin D deficiency, Anxiety Disorder and Iron Deficiency Anemia.      ROS:  The patient complained of fatigue; all other systems negative.       PHYSICAL EXAM:   The patient was alert, awake and oriented, no acute distress was noted. Oral examination did not reveal any mucosal lesions. Lymph node examination did not reveal any adenopathy. Neck examination revealed a supple neck, no thyromegaly or masses were noted. Chest examination revealed normal vesicular breath sounds. Heart examination revealed S-1 and S-2 without any murmurs. Abdominal examination revealed a non-tender abdomen, bowel sounds were positive, no organomegaly could be appreciated. Examination of the extremities did not reveal any tenderness or erythema. Examination of the skin did not reveal any lesions.      KPS:   90.      LABORATORY INVESTIGATIONS:  CBC showed a WBC count of 6.7, ANC was 4.1, Hemoglobin was 11.7, MCV was 72 and Platelets were 482; her Ferritin level was only 12. Medical problems and test results were reviewed with the patient today.      ASSESSMENT:    Iron Deficiency Anemia; Vitamin D deficiency. I spent a total of 62 minutes on the day of the visit, managing the care of this patient.      PLAN:   She should continue taking supplemental Vitamin D. I will arrange for her to receive a

## 2024-01-26 LAB
HGB A MFR BLD: 97.9 % (ref 96.4–98.8)
HGB A2 MFR BLD COLUMN CHROM: 2.1 % (ref 1.8–3.2)
HGB F MFR BLD: 0 % (ref 0–2)
HGB FRACT BLD-IMP: NORMAL
HGB S MFR BLD: 0 %

## 2024-01-29 LAB
ALBUMIN SERPL ELPH-MCNC: 3.8 G/DL (ref 2.9–4.4)
ALBUMIN/GLOB SERPL: 1.1 (ref 0.7–1.7)
ALPHA1 GLOB SERPL ELPH-MCNC: 0.2 G/DL (ref 0–0.4)
ALPHA2 GLOB SERPL ELPH-MCNC: 0.8 G/DL (ref 0.4–1)
B-GLOBULIN SERPL ELPH-MCNC: 1.3 G/DL (ref 0.7–1.3)
GAMMA GLOB SERPL ELPH-MCNC: 1.2 G/DL (ref 0.4–1.8)
GLOBULIN SER-MCNC: 3.5 G/DL (ref 2.2–3.9)
IGA SERPL-MCNC: 279 MG/DL (ref 87–352)
IGG SERPL-MCNC: 1253 MG/DL (ref 586–1602)
IGM SERPL-MCNC: 111 MG/DL (ref 26–217)
INTERPRETATION SERPL IEP-IMP: NORMAL
M PROTEIN SERPL ELPH-MCNC: NORMAL G/DL
PROT SERPL-MCNC: 7.3 G/DL (ref 6–8.5)

## 2024-01-30 ENCOUNTER — HOSPITAL ENCOUNTER (OUTPATIENT)
Dept: INFUSION THERAPY | Age: 54
Setting detail: INFUSION SERIES
Discharge: HOME | End: 2024-01-30

## 2024-01-30 VITALS
TEMPERATURE: 98.4 F | DIASTOLIC BLOOD PRESSURE: 68 MMHG | OXYGEN SATURATION: 98 % | SYSTOLIC BLOOD PRESSURE: 115 MMHG | HEART RATE: 77 BPM | RESPIRATION RATE: 16 BRPM

## 2024-01-30 DIAGNOSIS — D50.8 OTHER IRON DEFICIENCY ANEMIAS: Primary | ICD-10-CM

## 2024-01-30 PROCEDURE — 96365 THER/PROPH/DIAG IV INF INIT: CPT

## 2024-01-30 PROCEDURE — 6360000002 HC RX W HCPCS: Performed by: INTERNAL MEDICINE

## 2024-01-30 PROCEDURE — 2580000003 HC RX 258: Performed by: INTERNAL MEDICINE

## 2024-01-30 RX ORDER — ALBUTEROL SULFATE 90 UG/1
4 AEROSOL, METERED RESPIRATORY (INHALATION) PRN
OUTPATIENT
Start: 2024-02-06

## 2024-01-30 RX ORDER — SODIUM CHLORIDE 9 MG/ML
5-250 INJECTION, SOLUTION INTRAVENOUS PRN
OUTPATIENT
Start: 2024-02-06

## 2024-01-30 RX ORDER — SODIUM CHLORIDE 9 MG/ML
5-250 INJECTION, SOLUTION INTRAVENOUS PRN
Status: DISCONTINUED | OUTPATIENT
Start: 2024-01-30 | End: 2024-01-31 | Stop reason: HOSPADM

## 2024-01-30 RX ORDER — ONDANSETRON 2 MG/ML
8 INJECTION INTRAMUSCULAR; INTRAVENOUS
OUTPATIENT
Start: 2024-02-06

## 2024-01-30 RX ORDER — SODIUM CHLORIDE 9 MG/ML
INJECTION, SOLUTION INTRAVENOUS CONTINUOUS
OUTPATIENT
Start: 2024-02-06

## 2024-01-30 RX ORDER — ALBUTEROL SULFATE 90 UG/1
4 AEROSOL, METERED RESPIRATORY (INHALATION) PRN
Status: DISCONTINUED | OUTPATIENT
Start: 2024-01-30 | End: 2024-01-31 | Stop reason: HOSPADM

## 2024-01-30 RX ORDER — DIPHENHYDRAMINE HYDROCHLORIDE 50 MG/ML
50 INJECTION INTRAMUSCULAR; INTRAVENOUS
Status: DISCONTINUED | OUTPATIENT
Start: 2024-01-30 | End: 2024-01-31 | Stop reason: HOSPADM

## 2024-01-30 RX ORDER — ONDANSETRON 2 MG/ML
8 INJECTION INTRAMUSCULAR; INTRAVENOUS
Status: DISCONTINUED | OUTPATIENT
Start: 2024-01-30 | End: 2024-01-31 | Stop reason: HOSPADM

## 2024-01-30 RX ORDER — SODIUM CHLORIDE 9 MG/ML
INJECTION, SOLUTION INTRAVENOUS CONTINUOUS
Status: DISCONTINUED | OUTPATIENT
Start: 2024-01-30 | End: 2024-01-31 | Stop reason: HOSPADM

## 2024-01-30 RX ORDER — ACETAMINOPHEN 325 MG/1
650 TABLET ORAL
OUTPATIENT
Start: 2024-02-06

## 2024-01-30 RX ORDER — ACETAMINOPHEN 325 MG/1
650 TABLET ORAL
Status: DISCONTINUED | OUTPATIENT
Start: 2024-01-30 | End: 2024-01-31 | Stop reason: HOSPADM

## 2024-01-30 RX ORDER — SODIUM CHLORIDE 0.9 % (FLUSH) 0.9 %
5-40 SYRINGE (ML) INJECTION PRN
OUTPATIENT
Start: 2024-02-06

## 2024-01-30 RX ORDER — EPINEPHRINE 1 MG/ML
0.3 INJECTION, SOLUTION, CONCENTRATE INTRAVENOUS PRN
Status: DISCONTINUED | OUTPATIENT
Start: 2024-01-30 | End: 2024-01-31 | Stop reason: HOSPADM

## 2024-01-30 RX ORDER — HEPARIN 100 UNIT/ML
500 SYRINGE INTRAVENOUS PRN
OUTPATIENT
Start: 2024-02-06

## 2024-01-30 RX ORDER — EPINEPHRINE 1 MG/ML
0.3 INJECTION, SOLUTION, CONCENTRATE INTRAVENOUS PRN
OUTPATIENT
Start: 2024-02-06

## 2024-01-30 RX ORDER — SODIUM CHLORIDE 0.9 % (FLUSH) 0.9 %
5-40 SYRINGE (ML) INJECTION PRN
Status: DISCONTINUED | OUTPATIENT
Start: 2024-01-30 | End: 2024-01-31 | Stop reason: HOSPADM

## 2024-01-30 RX ORDER — DIPHENHYDRAMINE HYDROCHLORIDE 50 MG/ML
50 INJECTION INTRAMUSCULAR; INTRAVENOUS
OUTPATIENT
Start: 2024-02-06

## 2024-01-30 RX ADMIN — SODIUM CHLORIDE, PRESERVATIVE FREE 10 ML: 5 INJECTION INTRAVENOUS at 10:55

## 2024-01-30 RX ADMIN — FERUMOXYTOL 510 MG: 510 INJECTION INTRAVENOUS at 10:02

## 2024-01-30 RX ADMIN — SODIUM CHLORIDE 250 ML/HR: 9 INJECTION, SOLUTION INTRAVENOUS at 10:25

## 2024-01-30 RX ADMIN — SODIUM CHLORIDE, PRESERVATIVE FREE 10 ML: 5 INJECTION INTRAVENOUS at 09:10

## 2024-01-30 NOTE — PROGRESS NOTES
Arrived to the Infusion Center. Feraheme completed. Patient tolerated well.   Any issues or concerns during appointment: none.  Patient aware of next infusion appointment on 2/6/24 (date) at 0815 (time).  Patient aware of next lab and [facility] office visit on 3/20/24 (date) at 0930 (time).  Patient instructed to call provider with temperature of 100.4 or greater or nausea/vomiting/ diarrhea or pain not controlled by medications  Discharged amb.

## 2024-02-06 ENCOUNTER — HOSPITAL ENCOUNTER (OUTPATIENT)
Dept: INFUSION THERAPY | Age: 54
Setting detail: INFUSION SERIES
Discharge: HOME | End: 2024-02-06

## 2024-02-06 VITALS
DIASTOLIC BLOOD PRESSURE: 84 MMHG | TEMPERATURE: 98.3 F | OXYGEN SATURATION: 95 % | SYSTOLIC BLOOD PRESSURE: 122 MMHG | RESPIRATION RATE: 16 BRPM | HEART RATE: 84 BPM

## 2024-02-06 DIAGNOSIS — D50.8 OTHER IRON DEFICIENCY ANEMIAS: Primary | ICD-10-CM

## 2024-02-06 PROCEDURE — 6360000002 HC RX W HCPCS: Performed by: INTERNAL MEDICINE

## 2024-02-06 PROCEDURE — 96365 THER/PROPH/DIAG IV INF INIT: CPT

## 2024-02-06 PROCEDURE — 2580000003 HC RX 258: Performed by: INTERNAL MEDICINE

## 2024-02-06 RX ORDER — ACETAMINOPHEN 325 MG/1
650 TABLET ORAL
Status: DISCONTINUED | OUTPATIENT
Start: 2024-02-06 | End: 2024-02-07 | Stop reason: HOSPADM

## 2024-02-06 RX ORDER — ACETAMINOPHEN 325 MG/1
650 TABLET ORAL
OUTPATIENT
Start: 2024-02-06

## 2024-02-06 RX ORDER — EPINEPHRINE 1 MG/ML
0.3 INJECTION, SOLUTION, CONCENTRATE INTRAVENOUS PRN
Status: DISCONTINUED | OUTPATIENT
Start: 2024-02-06 | End: 2024-02-07 | Stop reason: HOSPADM

## 2024-02-06 RX ORDER — SODIUM CHLORIDE 9 MG/ML
INJECTION, SOLUTION INTRAVENOUS CONTINUOUS
Status: DISCONTINUED | OUTPATIENT
Start: 2024-02-06 | End: 2024-02-07 | Stop reason: HOSPADM

## 2024-02-06 RX ORDER — DIPHENHYDRAMINE HYDROCHLORIDE 50 MG/ML
50 INJECTION INTRAMUSCULAR; INTRAVENOUS
OUTPATIENT
Start: 2024-02-06

## 2024-02-06 RX ORDER — SODIUM CHLORIDE 0.9 % (FLUSH) 0.9 %
5-40 SYRINGE (ML) INJECTION PRN
Status: DISCONTINUED | OUTPATIENT
Start: 2024-02-06 | End: 2024-02-07 | Stop reason: HOSPADM

## 2024-02-06 RX ORDER — HEPARIN 100 UNIT/ML
500 SYRINGE INTRAVENOUS PRN
OUTPATIENT
Start: 2024-02-06

## 2024-02-06 RX ORDER — SODIUM CHLORIDE 9 MG/ML
5-250 INJECTION, SOLUTION INTRAVENOUS PRN
OUTPATIENT
Start: 2024-02-06

## 2024-02-06 RX ORDER — ALBUTEROL SULFATE 90 UG/1
4 AEROSOL, METERED RESPIRATORY (INHALATION) PRN
OUTPATIENT
Start: 2024-02-06

## 2024-02-06 RX ORDER — SODIUM CHLORIDE 9 MG/ML
5-250 INJECTION, SOLUTION INTRAVENOUS PRN
Status: CANCELLED | OUTPATIENT
Start: 2024-02-06

## 2024-02-06 RX ORDER — SODIUM CHLORIDE 0.9 % (FLUSH) 0.9 %
5-40 SYRINGE (ML) INJECTION PRN
OUTPATIENT
Start: 2024-02-06

## 2024-02-06 RX ORDER — ONDANSETRON 2 MG/ML
8 INJECTION INTRAMUSCULAR; INTRAVENOUS
Status: DISCONTINUED | OUTPATIENT
Start: 2024-02-06 | End: 2024-02-07 | Stop reason: HOSPADM

## 2024-02-06 RX ORDER — SODIUM CHLORIDE 9 MG/ML
INJECTION, SOLUTION INTRAVENOUS CONTINUOUS
OUTPATIENT
Start: 2024-02-06

## 2024-02-06 RX ORDER — ALBUTEROL SULFATE 90 UG/1
4 AEROSOL, METERED RESPIRATORY (INHALATION) PRN
Status: DISCONTINUED | OUTPATIENT
Start: 2024-02-06 | End: 2024-02-07 | Stop reason: HOSPADM

## 2024-02-06 RX ORDER — ONDANSETRON 2 MG/ML
8 INJECTION INTRAMUSCULAR; INTRAVENOUS
OUTPATIENT
Start: 2024-02-06

## 2024-02-06 RX ORDER — SODIUM CHLORIDE 9 MG/ML
5-250 INJECTION, SOLUTION INTRAVENOUS PRN
Status: DISCONTINUED | OUTPATIENT
Start: 2024-02-06 | End: 2024-02-07 | Stop reason: HOSPADM

## 2024-02-06 RX ORDER — DIPHENHYDRAMINE HYDROCHLORIDE 50 MG/ML
50 INJECTION INTRAMUSCULAR; INTRAVENOUS
Status: DISCONTINUED | OUTPATIENT
Start: 2024-02-06 | End: 2024-02-07 | Stop reason: HOSPADM

## 2024-02-06 RX ORDER — EPINEPHRINE 1 MG/ML
0.3 INJECTION, SOLUTION, CONCENTRATE INTRAVENOUS PRN
OUTPATIENT
Start: 2024-02-06

## 2024-02-06 RX ADMIN — SODIUM CHLORIDE, PRESERVATIVE FREE 10 ML: 5 INJECTION INTRAVENOUS at 10:05

## 2024-02-06 RX ADMIN — SODIUM CHLORIDE, PRESERVATIVE FREE 10 ML: 5 INJECTION INTRAVENOUS at 09:15

## 2024-02-06 RX ADMIN — FERUMOXYTOL 510 MG: 510 INJECTION INTRAVENOUS at 09:19

## 2024-02-06 RX ADMIN — SODIUM CHLORIDE 100 ML/HR: 9 INJECTION, SOLUTION INTRAVENOUS at 09:18

## 2024-02-06 NOTE — PROGRESS NOTES
Arrived to the Infusion Center. Feraheme completed. Patient tolerated well.   Any issues or concerns during appointment: none.  Discharged ambulatory.

## 2024-02-07 LAB — HBA1 GENE MUT TESTED BLD/T: NORMAL

## 2024-03-01 DIAGNOSIS — D50.8 OTHER IRON DEFICIENCY ANEMIAS: ICD-10-CM

## 2024-03-01 DIAGNOSIS — E55.9 VITAMIN D DEFICIENCY: Primary | ICD-10-CM

## 2025-03-27 ENCOUNTER — APPOINTMENT (OUTPATIENT)
Dept: GENERAL RADIOLOGY | Age: 55
End: 2025-03-27
Payer: COMMERCIAL

## 2025-03-27 ENCOUNTER — HOSPITAL ENCOUNTER (EMERGENCY)
Age: 55
Discharge: HOME | End: 2025-03-27
Attending: EMERGENCY MEDICINE
Payer: COMMERCIAL

## 2025-03-27 VITALS
SYSTOLIC BLOOD PRESSURE: 136 MMHG | WEIGHT: 260 LBS | BODY MASS INDEX: 36.4 KG/M2 | OXYGEN SATURATION: 99 % | HEIGHT: 71 IN | DIASTOLIC BLOOD PRESSURE: 104 MMHG | TEMPERATURE: 98.8 F | HEART RATE: 90 BPM | RESPIRATION RATE: 18 BRPM

## 2025-03-27 DIAGNOSIS — M25.521 RIGHT ELBOW PAIN: Primary | ICD-10-CM

## 2025-03-27 PROCEDURE — 99283 EMERGENCY DEPT VISIT LOW MDM: CPT

## 2025-03-27 PROCEDURE — 73080 X-RAY EXAM OF ELBOW: CPT

## 2025-03-27 RX ORDER — NAPROXEN 375 MG/1
375 TABLET ORAL 2 TIMES DAILY WITH MEALS
Qty: 20 TABLET | Refills: 0 | Status: SHIPPED | OUTPATIENT
Start: 2025-03-27 | End: 2025-04-06

## 2025-03-27 ASSESSMENT — PAIN SCALES - GENERAL: PAINLEVEL_OUTOF10: 10

## 2025-03-27 ASSESSMENT — PAIN - FUNCTIONAL ASSESSMENT: PAIN_FUNCTIONAL_ASSESSMENT: 0-10

## 2025-03-27 ASSESSMENT — PAIN DESCRIPTION - ORIENTATION: ORIENTATION: RIGHT

## 2025-03-27 ASSESSMENT — PAIN DESCRIPTION - LOCATION: LOCATION: ARM

## 2025-03-27 ASSESSMENT — PAIN DESCRIPTION - DESCRIPTORS: DESCRIPTORS: BURNING

## 2025-03-27 ASSESSMENT — PAIN DESCRIPTION - PAIN TYPE: TYPE: ACUTE PAIN

## 2025-03-27 ASSESSMENT — ENCOUNTER SYMPTOMS: BACK PAIN: 0

## 2025-03-27 NOTE — ED PROVIDER NOTES
[facility] Emergency Department Provider Note                   PCP:                None, None               Age: 54 y.o.      Sex: female     MEDICAL DECISION MAKING  Complexity of Problems Addressed:   acute uncomplicated illness or injury    Data Reviewed and Analyzed:  Category 1:    I have reviewed outside records from an external source for any pertinent PMH, ED visits, primary care visits, specialist visits, labs, EKG, and/or radiologic studies.  I reviewed external records: ED visit note from a different ED.   I reviewed external records: provider visit note from outside specialist.     Category 2:       I considered ordering labs but did not because I did not feel it would  of this patient.     I have reviewed the Radiologist interpretation of the radiologic studies. My medical decision making regarding the radiologic studies is based on the interpretation report of the board certified Radiologist.    Prescription drug management performed.        Category 3:     Discussion of management or test interpretation:    MDM  Number of Diagnoses or Management Options  Right elbow pain  Diagnosis management comments: Patient presents for right elbow pain which is consistent with tendinitis.  Her elbow x-ray was negative for fracture or dislocation.  Patient is neurovascularly intact without any signs of compartment syndrome.  I recommended RICE and patient was placed in a shoulder sling.  Patient was prescribed Naprosyn and discharged home with primary care follow-up.    Based on patient's symptoms, exam, and a thorough evaluation, I do not suspect DVT, vascular ischemia, necrotizing fasciitis, deep space abscess, bone fracture, joint dislocation, septic arthritis, acute osteomyelitis, compartment syndrome, rhabdomyolysis, or any other acute, emergent extremity process.              Cher Trujillo is a 54 y.o. female who presents to the Emergency Department with chief complaint of    Chief  Normal range of motion.      Comments: Tenderness to right lateral elbow without any swelling, warmth, or erythema.  There is no joint effusion.  She has full range of motion in her right elbow.  No other upper extremity tenderness.   Neurological:      General: No focal deficit present.      Mental Status: She is alert and oriented to person, place, and time.      Sensory: No sensory deficit.      Motor: No weakness.          Orders Placed This Encounter   Procedures    XR ELBOW RIGHT (MIN 3 VIEWS)    [facility] Shoulder Immobilizer, Right; XL       Procedures    Results Include:    No results found for this or any previous visit (from the past 24 hours).       XR ELBOW RIGHT (MIN 3 VIEWS)   Final Result   Findings/impression: No acute finding. No significant degenerative changes.         Electronically signed by Bob Lockwood                            I have considered all emergent medical conditions that could have caused patient's presentation to the emergency department today.  Based on their history, physical, and thorough evaluation, I have excluded all emergent medical conditions that require any further evaluation today in the ED or hospital.  I feel that the patient is safe for discharge.  The diagnosis and plan as well as the results of any testing (if done) and treatments (if done) today in the emergency department were communicated to the patient and/or their family/caregiver (if present).  The patient/caregiver verbalized understanding and compliance with the treatment plan.  Strict emergency department return precautions were given.  All questions and concerns were answered.      ICD-10-CM    1. Right elbow pain  M25.521           DISPOSITION Decision To Discharge 03/27/2025 09:23:06 AM                Voice dictation software was used during the making of this note.  This software is not perfect and grammatical and other typographical errors may be present.  This note has not

## 2025-03-27 NOTE — ED TRIAGE NOTES
Patient to triage with c/o right elbow pain for a few weeks. Denies any trauma or injury to the area.

## 2025-08-04 ENCOUNTER — HOSPITAL ENCOUNTER (EMERGENCY)
Age: 55
Discharge: HOME | End: 2025-08-04
Attending: EMERGENCY MEDICINE
Payer: COMMERCIAL

## 2025-08-04 VITALS
WEIGHT: 260 LBS | SYSTOLIC BLOOD PRESSURE: 155 MMHG | HEIGHT: 71 IN | OXYGEN SATURATION: 98 % | RESPIRATION RATE: 14 BRPM | TEMPERATURE: 97.8 F | DIASTOLIC BLOOD PRESSURE: 94 MMHG | BODY MASS INDEX: 36.4 KG/M2 | HEART RATE: 70 BPM

## 2025-08-04 DIAGNOSIS — M77.01 MEDIAL EPICONDYLITIS OF RIGHT ELBOW: Primary | ICD-10-CM

## 2025-08-04 DIAGNOSIS — F43.0 ACUTE REACTION TO SITUATIONAL STRESS: ICD-10-CM

## 2025-08-04 LAB
EKG ATRIAL RATE: 68 BPM
EKG DIAGNOSIS: NORMAL
EKG P AXIS: 74 DEGREES
EKG P-R INTERVAL: 202 MS
EKG Q-T INTERVAL: 368 MS
EKG QRS DURATION: 81 MS
EKG QTC CALCULATION (BAZETT): 392 MS
EKG R AXIS: 26 DEGREES
EKG T AXIS: 14 DEGREES
EKG VENTRICULAR RATE: 68 BPM

## 2025-08-04 PROCEDURE — 6370000000 HC RX 637 (ALT 250 FOR IP): Performed by: EMERGENCY MEDICINE

## 2025-08-04 PROCEDURE — 93005 ELECTROCARDIOGRAM TRACING: CPT | Performed by: EMERGENCY MEDICINE

## 2025-08-04 PROCEDURE — 6360000002 HC RX W HCPCS: Performed by: EMERGENCY MEDICINE

## 2025-08-04 PROCEDURE — 99284 EMERGENCY DEPT VISIT MOD MDM: CPT

## 2025-08-04 PROCEDURE — 93010 ELECTROCARDIOGRAM REPORT: CPT | Performed by: INTERNAL MEDICINE

## 2025-08-04 PROCEDURE — 96372 THER/PROPH/DIAG INJ SC/IM: CPT

## 2025-08-04 RX ORDER — KETOROLAC TROMETHAMINE 30 MG/ML
30 INJECTION, SOLUTION INTRAMUSCULAR; INTRAVENOUS
Status: COMPLETED | OUTPATIENT
Start: 2025-08-04 | End: 2025-08-04

## 2025-08-04 RX ORDER — HYDROXYZINE PAMOATE 25 MG/1
50 CAPSULE ORAL
Status: COMPLETED | OUTPATIENT
Start: 2025-08-04 | End: 2025-08-04

## 2025-08-04 RX ADMIN — KETOROLAC TROMETHAMINE 30 MG: 30 INJECTION, SOLUTION INTRAMUSCULAR at 04:43

## 2025-08-04 RX ADMIN — HYDROXYZINE PAMOATE 50 MG: 25 CAPSULE ORAL at 04:43

## 2025-08-04 ASSESSMENT — LIFESTYLE VARIABLES: HOW OFTEN DO YOU HAVE A DRINK CONTAINING ALCOHOL: NEVER

## 2025-08-04 ASSESSMENT — PAIN SCALES - GENERAL: PAINLEVEL_OUTOF10: 8

## 2025-08-04 ASSESSMENT — PAIN DESCRIPTION - LOCATION: LOCATION: ARM
